# Patient Record
Sex: FEMALE | Race: WHITE | NOT HISPANIC OR LATINO | Employment: FULL TIME | URBAN - METROPOLITAN AREA
[De-identification: names, ages, dates, MRNs, and addresses within clinical notes are randomized per-mention and may not be internally consistent; named-entity substitution may affect disease eponyms.]

---

## 2017-04-13 ENCOUNTER — ALLSCRIPTS OFFICE VISIT (OUTPATIENT)
Dept: OTHER | Facility: OTHER | Age: 42
End: 2017-04-13

## 2017-06-21 ENCOUNTER — ALLSCRIPTS OFFICE VISIT (OUTPATIENT)
Dept: OTHER | Facility: OTHER | Age: 42
End: 2017-06-21

## 2017-07-27 ENCOUNTER — ALLSCRIPTS OFFICE VISIT (OUTPATIENT)
Dept: OTHER | Facility: OTHER | Age: 42
End: 2017-07-27

## 2017-07-27 PROCEDURE — 88305 TISSUE EXAM BY PATHOLOGIST: CPT | Performed by: SURGERY

## 2017-07-30 ENCOUNTER — LAB REQUISITION (OUTPATIENT)
Dept: LAB | Facility: HOSPITAL | Age: 42
End: 2017-07-30
Payer: COMMERCIAL

## 2017-07-30 DIAGNOSIS — D49.2 NEOPLASM OF UNSPECIFIED BEHAVIOR OF BONE, SOFT TISSUE, AND SKIN: ICD-10-CM

## 2017-08-16 ENCOUNTER — ALLSCRIPTS OFFICE VISIT (OUTPATIENT)
Dept: OTHER | Facility: OTHER | Age: 42
End: 2017-08-16

## 2017-08-16 ENCOUNTER — GENERIC CONVERSION - ENCOUNTER (OUTPATIENT)
Dept: OTHER | Facility: OTHER | Age: 42
End: 2017-08-16

## 2017-10-05 ENCOUNTER — GENERIC CONVERSION - ENCOUNTER (OUTPATIENT)
Dept: OTHER | Facility: OTHER | Age: 42
End: 2017-10-05

## 2017-10-23 ENCOUNTER — GENERIC CONVERSION - ENCOUNTER (OUTPATIENT)
Dept: OTHER | Facility: OTHER | Age: 42
End: 2017-10-23

## 2017-12-07 ENCOUNTER — ALLSCRIPTS OFFICE VISIT (OUTPATIENT)
Dept: OTHER | Facility: OTHER | Age: 42
End: 2017-12-07

## 2017-12-07 ENCOUNTER — GENERIC CONVERSION - ENCOUNTER (OUTPATIENT)
Dept: OTHER | Facility: OTHER | Age: 42
End: 2017-12-07

## 2017-12-08 LAB
A/G RATIO (HISTORICAL): 1.8 (ref 1.2–2.2)
ALBUMIN SERPL BCP-MCNC: 4.3 G/DL (ref 3.5–5.5)
ALP SERPL-CCNC: 47 IU/L (ref 39–117)
ALT SERPL W P-5'-P-CCNC: 27 IU/L (ref 0–32)
ANTINUCLEAR ANTIBODIES, IFA (HISTORICAL): NEGATIVE
AST SERPL W P-5'-P-CCNC: 26 IU/L (ref 0–40)
BASOPHILS # BLD AUTO: 0 %
BASOPHILS # BLD AUTO: 0 X10E3/UL (ref 0–0.2)
BILIRUB SERPL-MCNC: 0.9 MG/DL (ref 0–1.2)
BUN SERPL-MCNC: 8 MG/DL (ref 6–24)
BUN/CREA RATIO (HISTORICAL): 10 (ref 9–23)
CALCIUM SERPL-MCNC: 9.2 MG/DL (ref 8.7–10.2)
CHLORIDE SERPL-SCNC: 99 MMOL/L (ref 96–106)
CO2 SERPL-SCNC: 28 MMOL/L (ref 18–29)
CREAT SERPL-MCNC: 0.79 MG/DL (ref 0.57–1)
DEPRECATED RDW RBC AUTO: 14.3 % (ref 12.3–15.4)
EGFR AFRICAN AMERICAN (HISTORICAL): 108 ML/MIN/1.73
EGFR-AMERICAN CALC (HISTORICAL): 93 ML/MIN/1.73
EOSINOPHIL # BLD AUTO: 0.2 X10E3/UL (ref 0–0.4)
EOSINOPHIL # BLD AUTO: 2 %
ERYTHROCYTE SEDIMENTATION RATE (HISTORICAL): 3 MM/HR (ref 0–32)
GLUCOSE SERPL-MCNC: 93 MG/DL (ref 65–99)
HCT VFR BLD AUTO: 36.3 % (ref 34–46.6)
HGB BLD-MCNC: 12 G/DL (ref 11.1–15.9)
IMM.GRANULOCYTES (CD4/8) (HISTORICAL): 0 X10E3/UL (ref 0–0.1)
IMM.GRANULOCYTES (CD4/8) (HISTORICAL): 1 %
LDH (HISTORICAL): 139 IU/L (ref 119–226)
LYME IGG/IGM AB (HISTORICAL): <0.91 ISR (ref 0–0.9)
LYMPHOCYTES # BLD AUTO: 1.9 X10E3/UL (ref 0.7–3.1)
LYMPHOCYTES # BLD AUTO: 21 %
MCH RBC QN AUTO: 28.4 PG (ref 26.6–33)
MCHC RBC AUTO-ENTMCNC: 33.1 G/DL (ref 31.5–35.7)
MCV RBC AUTO: 86 FL (ref 79–97)
MONOCYTES # BLD AUTO: 1 X10E3/UL (ref 0.1–0.9)
MONOCYTES (HISTORICAL): 11 %
NEUTROPHILS # BLD AUTO: 5.8 X10E3/UL (ref 1.4–7)
NEUTROPHILS # BLD AUTO: 65 %
PLATELET # BLD AUTO: 243 X10E3/UL (ref 150–379)
POTASSIUM SERPL-SCNC: 3.6 MMOL/L (ref 3.5–5.2)
RA LATEX TURBID (HISTORICAL): <10 IU/ML (ref 0–13.9)
RBC (HISTORICAL): 4.23 X10E6/UL (ref 3.77–5.28)
SODIUM SERPL-SCNC: 142 MMOL/L (ref 134–144)
TOT. GLOBULIN, SERUM (HISTORICAL): 2.4 G/DL (ref 1.5–4.5)
TOTAL PROTEIN (HISTORICAL): 6.7 G/DL (ref 6–8.5)
URIC ACID (HISTORICAL): 3.1 MG/DL (ref 2.5–7.1)
WBC # BLD AUTO: 8.9 X10E3/UL (ref 3.4–10.8)

## 2017-12-08 NOTE — PROGRESS NOTES
Assessment    1  Abnormal MRI of head (793 0) (R93 0)   2  Chronic headaches (784 0) (R51)   3  Dizziness (780 4) (R42)    Plan  Abnormal MRI of head, Chronic headaches    · (1) CBC/PLT/DIFF; Status:Active; Requested for:29Hhe7616;    · *1 - [facility]  *  Status: Active  Requested for: 82OQG0750  Care Summary provided  : Yes  Chronic headaches    · Propranolol HCl - 10 MG Oral Tablet; Take 1 tablet twice daily   · (1) TAIWO SCREEN W/REFLEX TO TITER/PATTERN; Status:Active; Requestedfor:07Dec2017;    · (1) COMPREHENSIVE METABOLIC PANEL; Status:Active; Requested for:07Dec2017;    · (1) LD (LDH); Status:Active; Requested for:07Dec2017;    · (1) LYME ANTIBODY PROFILE W/REFLEX TO WESTERN BLOT; Status:Active; Requested for:07Dec2017;    · (1) RHEUMATOID FACTOR SCREEN; Status:Active; Requested for:07Dec2017;    · (1) SED RATE; Status:Active; Requested for:07Dec2017;    · (1) URIC ACID; Status:Active; Requested for:07Dec2017;     Discussion/Summary    Etiology is unclearat labs and will reorder someMRI report and is nonspecific but showing white matter changesrefer to Neurology for evaluationlabs delay come backWill start propranolol 10 mg 1 twice a day for migraine prophylaxis and see if this helps her headaches  Chief Complaint    1  Dizziness   2  Headache  Dizziness, headaches x 1 month      History of Present Illness  HPI: here for headaches for 3 weekschronic and never relentinggetting attacks of significant dizziness to the point of vomitingseen urgent care multiple times and had MRI as well as lab work orderedwas abnormal showing white matter changes but very nonspecificdisease test was negative      Review of Systems   Constitutional: No fever, no chills, feels well, no tiredness, no recent weight gain or loss  ENT: no ear ache, no loss of hearing, no nosebleeds or nasal discharge, no sore throat or hoarseness    Cardiovascular: no complaints of slow or fast heart rate, no chest pain, no palpitations, no leg claudication or lower extremity edema  Respiratory: no complaints of shortness of breath, no wheezing, no dyspnea on exertion, no orthopnea or PND  Breasts: no complaints of breast pain, breast lump or nipple discharge  Gastrointestinal: no complaints of abdominal pain, no constipation, no nausea or diarrhea, no vomiting, no bloody stools  Genitourinary: no complaints of dysuria, no incontinence, no pelvic pain, no dysmenorrhea, no vaginal discharge or abnormal vaginal bleeding  Musculoskeletal: no complaints of arthralgia, no myalgia, no joint swelling or stiffness, no limb pain or swelling  Integumentary: no complaints of skin rash or lesion, no itching or dry skin, no skin wounds  Neurological: as noted in HPI  Active Problems  1  Acute bronchitis due to Haemophilus influenzae (466 0,041 5) (J20 1)   2  Acute pansinusitis, unspecified (461 8) (J01 40)   3  Acute upper respiratory infection (465 9) (J06 9)   4  Amenorrhea (626 0) (N91 2)   5  Asthma (493 90) (J45 909)   6  Birth control (V25 9) (Z30 9)   7  Contact dermatitis (692 9) (L25 9)   8  Depression (311) (F32 9)   9  Dyspnea (786 09) (R06 00)   10  Facial swelling (784 2) (R22 0)   11  Fatigue (780 79) (R53 83)   12  GERD (gastroesophageal reflux disease) (530 81) (K21 9)   13  Lateral epicondylitis, unspecified laterality (726 32) (M77 10)   14  Muscle spasm (728 85) (M62 838)   15  Neck pain (723 1) (M54 2)   16  Never A Smoker   17  Panic attack (300 01) (F41 0)   18  Pap smear for cervical cancer screening (V76 2) (Z12 4)   19  Rash (782 1) (R21)   20  Sinusitis (473 9) (J32 9)   21  Skin neoplasm (239 2) (D49 2)   22  Tendonitis of knee, left (727 09) (P79 307)    Past Medical History  1  History of Difficulty breathing (786 09) (R06 89)   2  History of tension headache (V13 89) (J88 568)  Active Problems And Past Medical History Reviewed: The active problems and past medical history were reviewed and updated today  Family History  Mother    1  No pertinent family history  Family History Reviewed: The family history was reviewed and updated today  Social History     · Never A Smoker  The social history was reviewed and updated today  The social history was reviewed and is unchanged  Surgical History  Surgical History Reviewed: The surgical history was reviewed and updated today  Current Meds   1  ALPRAZolam 0 25 MG Oral Tablet; Take 1 tablet twice daily; Therapy: 35EVQ2127 to (Evaluate:04Nov2017); Last Rx:05Oct2017 Ordered   2  Hydrocortisone 2 5 % External Ointment; apply twice daily to affected area; Therapy: 88RZH4643 to (Evaluate:33Vdf3674)  Requested for: 53CJH9351; Last Rx:45Jrj8985 Ordered   3  ProAir  (90 Base) MCG/ACT Inhalation Aerosol Solution; INHALE 1 TO 2 PUFFS EVERY 4 TO 6 HOURS AS NEEDED; Therapy: 71Efs8352 to (Evaluate:11Nov2016)  Requested for: 07Hdo4782; Last Rx:85Zmg0398 Ordered   4  Venlafaxine HCl ER 75 MG Oral Capsule Extended Release 24 Hour; TAKE 1 CAPSULE ONCE DAILY; Therapy: (Recorded:23Oct2017) to Recorded    The medication list was reviewed and updated today  Allergies  1  No Known Drug Allergies    Vitals   Recorded: 78SFE3767 01:27PM   Heart Rate 90, R Radial   Pulse Quality Regular, R Radial   Systolic 369, RUE, Sitting   Diastolic 76, RUE, Sitting   Height 5 ft    Weight 156 lb    BMI Calculated 30 47   BSA Calculated 1 68       Physical Exam   Constitutional  General appearance: No acute distress, well appearing and well nourished  Ears, Nose, Mouth, and Throat  Otoscopic examination: Tympanic membranes translucent with normal light reflex  Canals patent without erythema  Oropharynx: Normal with no erythema, edema, exudate or lesions  Pulmonary  Respiratory effort: No increased work of breathing or signs of respiratory distress  Auscultation of lungs: Clear to auscultation     Cardiovascular  Auscultation of heart: Normal rate and rhythm, normal S1 and S2, without murmurs  Carotid pulses: Normal    Abdomen  Abdomen: Non-tender, no masses  Liver and spleen: No hepatomegaly or splenomegaly  Lymphatic  Palpation of lymph nodes in neck: No lymphadenopathy  Musculoskeletal  Gait and station: Normal    Digits and nails: Normal without clubbing or cyanosis  Inspection/palpation of joints, bones, and muscles: Normal    Skin  Skin and subcutaneous tissue: Normal without rashes or lesions  Neurologic  Cranial nerves: Cranial nerves 2-12 intact  Reflexes: 2+ and symmetric  Sensation: No sensory loss  Psychiatric  Orientation to person, place, and time: Normal    Mood and affect: Normal          Future Appointments    Date/Time Provider Specialty Site   01/05/2018 10:00 AM DANNY Wells   Neurology  [facility]       Signatures   Electronically signed by : Sammy Dunbar DO; Dec  7 2017  5:38PM EST                       (Author)

## 2017-12-15 ENCOUNTER — GENERIC CONVERSION - ENCOUNTER (OUTPATIENT)
Dept: OTHER | Facility: OTHER | Age: 42
End: 2017-12-15

## 2018-01-05 ENCOUNTER — ALLSCRIPTS OFFICE VISIT (OUTPATIENT)
Dept: OTHER | Facility: OTHER | Age: 43
End: 2018-01-05

## 2018-01-05 DIAGNOSIS — R06.00 DYSPNEA: ICD-10-CM

## 2018-01-05 DIAGNOSIS — R42 DIZZINESS AND GIDDINESS: ICD-10-CM

## 2018-01-05 DIAGNOSIS — G43.109 MIGRAINE WITH AURA AND WITHOUT STATUS MIGRAINOSUS, NOT INTRACTABLE: ICD-10-CM

## 2018-01-07 NOTE — CONSULTS
Assessment   1  Dizziness (780 4) (R42)   2  Classical migraine without intractable migraine (346 00) (G43 109)   3  Vertigo (780 4) (R42)   4  Postural dizziness with presyncope (780 4,780 2) (U79,Y00)    Plan   Classical migraine without intractable migraine    · Butalbital-APAP-Caffeine -40 MG Oral Capsule; TAKE 1 CAPSULE EVERY 4    HOURS AS NEEDED   Rx By: Myesha Pickard; Dispense: 30 Days ; #:12 Capsule; Refill: 1;For: Classical migraine without intractable migraine; ADRIANNE = N; Faxed To: [facility]   · Meclizine HCl - 12 5 MG Oral Tablet; TAKE 1 TABLET 3 TIMES DAILY AS NEEDED   Rx By: Myesha Pickard; Dispense: 30 Days ; #:90 Tablet; Refill: 2;For: Classical migraine without intractable migraine; ADRIANNE = N; Faxed To: [facility]   · Prochlorperazine Maleate 5 MG Oral Tablet; TAKE 1 TABLET EVERY 4 HOURS AS    NEEDED FOR NAUSEA   Rx By: Myesha Pickard; Dispense: 30 Days ; #:30 Tablet; Refill: 0;For: Classical migraine without intractable migraine; ADRIANNE = N; Faxed To: [facility]   · Venlafaxine HCl ER 37 5 MG Oral Capsule Extended Release 24 Hour; TAKE 1    CAPSULE ONCE DAILY WITH FOOD   Rx By: Myesha Pickard; Dispense: 30 Days ; #:30 Capsule Extended Release 24 Hour; Refill: 2;For: Classical migraine without intractable migraine; ADRIANNE = N; Faxed To: [address] [place] [address]   · [address] [place] [address] Physician Assistant Co-Management  6 WEEKS FOLLOW UP     Status: Active  Requested for: 57RWK3717   Ordered; For: Classical migraine without intractable migraine; Ordered By: Myesha Pickard Performed:  Due: 87TIU1545  Care Summary provided  : Yes   · Follow-up visit in 6 weeks Evaluation and Treatment  Follow-up  Status: Complete  Done:    10QSC6736   Ordered; For: Classical migraine without intractable migraine; Ordered By: Myesha Pickard Performed:  Due: 57JBI7559;  Last Updated By: Junior King; 1/5/2018 11:20:38 AM  Classical migraine without intractable migraine, Vertigo    · VAS CAROTID COMPLETE STUDY; SIDE:Bilateral; Status:Active; Requested    [facility]69UMD2493; Perform:[facility]; BRIAN:70JFQ0458; Last Updated By:Chavez Hurst; 2018 11:20:38 AM;Ordered; For:Classical migraine without intractable migraine, Vertigo; Ordered By:Kamryn Tierney; Discussion/Summary   Mrs Viky Arriaga has presented for evaluation of worsening headaches and anxiety in the setting of increased stress level in her family  Patient described chronic daily headaches and break through migraine with retro-orbital pain  MRI brain was completed and no CD was available for review  Report suggest patient has mild white matter changes likely due to [facility] matter disease, no radiographic signs of MS or other demyelination  We agreed to consider starting preventive therapy with magnesium 400 mg, riboflavin 200 mg bid,and Vitamin B12 1000 mcg daily  We will increase her venlafaxine by adding additional 37 5 mg to her current regimen of Venlafaxine 75 mg daily, as she has more dizziness while taking 150 mg HS  Patient discontinued propranolol previously  For abortive therapy - she will start Compazine 5 mg and fioricet in addition to Ibuprofen 400 mg  Patient has transient bouts of vertigo - clinically resembling BPPV - she was asked to follow with primary team to be further evaluated by ENT and consider vestibular therapy once needed  Meclizine 12 5 mg provided- side effects such as sedation was discussed  No signs of nystagmus of abnormal OKN noted on today's evaluation  Vestibular migraine is on differential, but her presentation is atypical       Patient's mother carries diagnosis of Menieres disease - formal evaluation by ENT team was recommended as well        Patient described the event of near syncope during the vertigo and events of panic attack- she will have Carotid Doppler US to rule out carotid sinus pathology, but she would also benefit from the stress test due to several family members had cardiac death  Patient was asked to follow with primary team as well  No focal neurologic deficit noted on exam          Counseling Documentation With Imm: Greater than 50% of the 60 minutes evaluation was a face-to-face discussion regarding  the pathophysiology of her current symptoms and further plan, as well as counseling, educating, and coordinating the patientâs care  Chief Complaint   Chief Complaint Free Text Note Form: Patient is here today as a new patient for a consult for an abnormal MRI of the head  History of Present Illness   Mrs Lucila Erazo is a 77-year-old female with a history of depression, anxiety, panic attack, bronchitis/ pansinusitis, chronic headaches, dizziness, who presented to [facility] multiple [address] for evaluation of her abnormal brain MRI  Patient had presented with her   No CD with MRI available during her encounter  Patient describing having progressive worsening of her headache since 12 months ago  Patient described previously she had headaches intensity of 4-5/10, experiencing them once a week, but now frequency and intensity of her headache has been getting worse  She described her headache severity is 9/10, predominantly in frontal and retro-orbital area bilaterally  Patient was prescribed propranolol and venlafaxine, she discontinued propranolol and she was not able to tolerate increased dose of venlafaxine  Patient also stated that her side is getting worse and evaluation by Ophthalmology suggested patient requires glasses  Brain MRI was completed patient has mild white matter changes likely due to migraines with no ischemic changes or hemorrhages appreciated, as per the report  Under issue patient described having bouts of vertigo, they may or may not be triggered by movement of her head at come at any time during the day    Patient had single event of vertigo were she felt like passing out  Vertigo comes and goes, not every day but up to 10 times per week  Patient had not seen ENT specialist, her mother was diagnosed with Meniere's disease  Review of Systems   Neurological ROS:      Constitutional: no fever, no chills, no recent weight gain, no recent weight loss, no complaints of feeling tired, no changes in appetite  HEENT:  no sinus problems, not feeling congested, no blurred vision, no dryness of the eyes, no eye pain, no hearing loss, no tinnitus, no mouth sores, no sore throat, no hoarseness, no dysphagia, no masses, no bleeding  Cardiovascular:  no chest pain or pressure, no palpitations present, the heart rate was not rapid or irregular, no swelling in the arms or legs, no poor circulation  Respiratory:  no unusual or persistant cough, no shortness of breath with or without exertion  Gastrointestinal:  no nausea, no vomiting, no diarrhea, no abdominal pain, no changes in bowel habits, no melena, no loss of bowel control  Genitourinary:  no incontinence, no feelings of urinary urgency, no increase in frequency, no urinary hesitancy, no dysuria, no hematuria  Musculoskeletal:  no arthralgias, no myalgias, no immobility or loss of function, no head/neck/back pain, no pain while walking  Integumentary  no masses, no rash, no skin lesions, no livedo reticularis  Psychiatric: anxiety-- and-- depression  Endocrine  no unusual weight loss or gain, no excessive urination, no excessive thirst, no hair loss or gain, no hot or cold intolerance, no menstrual period change or irregularity, no loss of sexual ability or drive, no erection difficulty, no nipple discharge  Hematologic/Lymphatic:  no unusual bleeding, no tendency for easy bruising, no clotting skin or lumps  Neurological General: headache        Neurological Mental Status:  no confusion, no mood swings, no alteration or loss of consciousness, no difficulty expressing/understanding speech, no memory problems  Neurological Cranial Nerves: vertigo or dizziness  Neurological Motor findings include:  no tremor, no twitching, no cramping(pre/post exercise), no atrophy  Neurological Coordination:  no unsteadiness, no vertigo or dizziness, no clumsiness, no problems reaching for objects  Neurological Sensory:  no numbness, no pain, no tingling, does not fall when eyes closed or taking a shower  Neurological Gait:  no difficulty walking, not falling to one side, no sensation of being pushed, has not had falls  ROS Reviewed:    ROS reviewed  Active Problems   1  Abnormal MRI of head (793 0) (R93 0)   2  Acute bronchitis due to Haemophilus influenzae (466 0,041 5) (J20 1)   3  Acute pansinusitis, unspecified (461 8) (J01 40)   4  Acute upper respiratory infection (465 9) (J06 9)   5  Amenorrhea (626 0) (N91 2)   6  Asthma (493 90) (J45 909)   7  Birth control (V25 9) (Z30 9)   8  Chronic headaches (784 0) (R51)   9  Contact dermatitis (692 9) (L25 9)   10  Depression (311) (F32 9)   11  Dizziness (780 4) (R42)   12  Dyspnea (786 09) (R06 00)   13  Facial swelling (784 2) (R22 0)   14  Fatigue (780 79) (R53 83)   15  GERD (gastroesophageal reflux disease) (530 81) (K21 9)   16  Lateral epicondylitis, unspecified laterality (726 32) (M77 10)   17  Muscle spasm (728 85) (M62 838)   18  Neck pain (723 1) (M54 2)   19  Never A Smoker   20  Panic attack (300 01) (F41 0)   21  Pap smear for cervical cancer screening (V76 2) (Z12 4)   22  Rash (782 1) (R21)   23  Sinusitis (473 9) (J32 9)   24  Skin neoplasm (239 2) (D49 2)   25  Tendonitis of knee, left (727 09) (C55 277)    Past Medical History   1  History of Difficulty breathing (786 09) (R06 89)   2  History of tension headache (V13 89) (L90 797)  Active Problems And Past Medical History Reviewed:     The active problems and past medical history were reviewed and updated today  Surgical History   Surgical History Reviewed: The surgical history was reviewed and updated today  Family History   Mother    1  No pertinent family history  Family History Reviewed: The family history was reviewed and updated today  Social History    · Never A Smoker  Social History Reviewed: The social history was reviewed and updated today  The social history was reviewed and is unchanged  Current Meds    1  ALPRAZolam 0 25 MG Oral Tablet; Take 1 tablet twice daily; Therapy: 71IDG2652 to (Evaluate:04Nov2017); Last Rx:05Oct2017 Ordered   2  Hydrocortisone 2 5 % External Ointment; apply twice daily to affected area; Therapy: 05FHO2811 to (Evaluate:49Tkw1747)  Requested for: 19PVS2521; Last     Rx:95Pxh0772 Ordered   3  ProAir  (90 Base) MCG/ACT Inhalation Aerosol Solution; INHALE 1 TO 2 PUFFS     EVERY 4 TO 6 HOURS AS NEEDED; Therapy: 61Vot7632 to (Evaluate:11Nov2016)  Requested for: 96Woo6296; Last     Rx:45Iii3151 Ordered   4  Venlafaxine HCl ER 75 MG Oral Capsule Extended Release 24 Hour; TAKE 1 CAPSULE     ONCE DAILY; Therapy: (Recorded:23Oct2017) to Recorded    Allergies   1  No Known Drug Allergies    Vitals   Signs   Recorded: 64NCG3563 10:04AM   Heart Rate: 100  Respiration: 18  Systolic: 281  Diastolic: 85  Height: 5 ft   Weight: 152 lb 6 oz  BMI Calculated: 29 76  BSA Calculated: 1 66  O2 Saturation: 99    Results/Data   CONSTITUTIONAL: NAD, pleasant  NECK: supple, no lymphadenopathy, no thyromegaly, no JVD  CARDIOVASCULAR: RRR, normal S1S2, no murmurs, no rubs  RESP: clear to auscultation bilaterally, no wheezes/rhonchi/rales  ABDOMEN: soft, non tender, non distended  SKIN: no rash or skin lesions  EXTREMITIES: no edema, pulses 2+bilaterally  PSYCH: appropriate mood and affect     NEUROLOGIC COMPREHENSIVE EXAM: Patient is oriented to person, place and time, NAD; appropriate affect   CN II, III, IV, V, VI, VII,VIII,IX,X,XI-XII intact with EOMI, PERRLA, OKN intact, VF grossly intact, fundi poorly visualized secondary to pupillary constriction; symmetric face noted  Motor: 5/5 UE/LE bilateral symmetric; Sensory: intact to light touch and pinprick bilaterally; normal vibration sensation feet bilaterally; Coordination within normal limits on FTN and LUKE testing; DTR: 2/4 through, no Babinski, no clonus  Tandem gait is intact  Romberg: negative               Future Appointments      Date/Time Provider Specialty Site   02/16/2018 12:00 PM Nadya Marion MD Neurology [address]     Signatures    Electronically signed by : DANNY Rebollar ; Jan 6 2018  5:57PM EST                       (Author)

## 2018-01-09 ENCOUNTER — GENERIC CONVERSION - ENCOUNTER (OUTPATIENT)
Dept: OTHER | Facility: OTHER | Age: 43
End: 2018-01-09

## 2018-01-12 VITALS
HEART RATE: 88 BPM | BODY MASS INDEX: 28.86 KG/M2 | DIASTOLIC BLOOD PRESSURE: 70 MMHG | WEIGHT: 147 LBS | HEIGHT: 60 IN | RESPIRATION RATE: 16 BRPM | SYSTOLIC BLOOD PRESSURE: 124 MMHG

## 2018-01-13 VITALS — WEIGHT: 152 LBS | BODY MASS INDEX: 29.84 KG/M2 | HEIGHT: 60 IN

## 2018-01-13 NOTE — MISCELLANEOUS
Message   Recorded as Task   Date: 10/17/2016 03:52 PM, Created By: Mateusz Neville   Task Name: Follow Up   Assigned To: Maria Luisa Luevano   Regarding Patient: GISELA ZARATE, Status: Active   CommentMaud Nannette - 17 Oct 2016 3:52 PM     TASK CREATED  Caller: Self; General Medical Question; [phone] (Home); [phone] (Work)  MEDICATION IS HELPING WITH CHEST DISCOMFORT/ACID REFLUX, HAS NO PROBLEMS  WILL NEED REFILL IF YOU WANT HER TO CONTINUE MED  SHE USES [facility] AND CAN BE REACHED -[phone]   Walker Álvarez - 18 Oct 2016 4:53 PM     TASK EDITED                 i would stop and see if her symptoms return-- if they do-- they we can restart   Maria Luisa Luevano - 18 Oct 2016 5:15 PM     TASK EDITED  Patient advised  PLM        Active Problems    1  Acute bronchitis due to Haemophilus influenzae (466 0,041 5) (J20 1)   2  Acute pansinusitis, unspecified (461 8) (J01 40)   3  Acute upper respiratory infection (465 9) (J06 9)   4  Amenorrhea (626 0) (N91 2)   5  Asthma (493 90) (J45 909)   6  Birth control (V25 9) (Z30 9)   7  Depression (311) (F32 9)   8  Dyspnea (786 09) (R06 00)   9  Facial swelling (784 2) (R22 0)   10  Fatigue (780 79) (R53 83)   11  GERD (gastroesophageal reflux disease) (530 81) (K21 9)   12  Lateral epicondylitis, unspecified laterality (726 32) (M77 10)   13  Muscle spasm (728 85) (M62 838)   14  Neck pain (723 1) (M54 2)   15  Pap smear for cervical cancer screening (V76 2) (Z12 4)   16  Rash (782 1) (R21)   17  Sinusitis (473 9) (J32 9)   18  Tendonitis of knee, left (727 09) (V46 077)    Current Meds   1  Effexor XR 75 MG Oral Capsule Extended Release 24 Hour (Venlafaxine HCl ER); TAKE   1 CAPSULE Daily Recorded   2  Pantoprazole Sodium 40 MG Oral Tablet Delayed Release (Protonix); Take 1 tablet   daily; Therapy: 57XAZ5378 to (Kaushal Vasquez)  Requested for: 19SYS3921; Last   Rx:05Oct2016 Ordered   3   ProAir  (90 Base) MCG/ACT Inhalation Aerosol Solution; INHALE 1 TO 2 PUFFS   EVERY 4 TO 6 HOURS AS NEEDED; Therapy: 90Hjx1587 to (Evaluate:11Nov2016)  Requested for: 69Ngg5823; Last   Rx:54Yig6875 Ordered    Allergies    1   No Known Drug Allergies    Signatures   Electronically signed by : Sebastián Deshpande, ; Oct 18 2016  5:16PM EST                       (Author)

## 2018-01-14 VITALS
HEART RATE: 90 BPM | BODY MASS INDEX: 27.38 KG/M2 | RESPIRATION RATE: 16 BRPM | WEIGHT: 145 LBS | TEMPERATURE: 98 F | SYSTOLIC BLOOD PRESSURE: 116 MMHG | DIASTOLIC BLOOD PRESSURE: 70 MMHG | HEIGHT: 61 IN

## 2018-01-15 ENCOUNTER — GENERIC CONVERSION - ENCOUNTER (OUTPATIENT)
Dept: OTHER | Facility: OTHER | Age: 43
End: 2018-01-15

## 2018-01-15 ENCOUNTER — HOSPITAL ENCOUNTER (OUTPATIENT)
Dept: ULTRASOUND IMAGING | Facility: CLINIC | Age: 43
Discharge: HOME | End: 2018-01-15
Payer: COMMERCIAL

## 2018-01-15 DIAGNOSIS — R42 DIZZINESS AND GIDDINESS: ICD-10-CM

## 2018-01-15 DIAGNOSIS — G43.109 MIGRAINE WITH AURA AND WITHOUT STATUS MIGRAINOSUS, NOT INTRACTABLE: ICD-10-CM

## 2018-01-15 PROCEDURE — 93880 EXTRACRANIAL BILAT STUDY: CPT

## 2018-01-15 NOTE — MISCELLANEOUS
Message   Recorded as Task   Date: 10/23/2017 04:19 PM, Created By: Roberto Carlos Garcia   Task Name: Medical Complaint Callback   Assigned To: Perla Carrizales   Regarding Patient: GISELA ZARATE, Status: Active   Comment: Roberto Carlos Garcia - 23 Oct 2017 4:19 PM     TASK CREATED  Caller: Self; Medical Complaint; [phone] (Home); [phone] (Work)  PT SAID EFFEXOR HAS BEEN GIVEN HER HA PT # [phone]   Page,Walker - 23 Oct 2017 4:59 PM     TASK EDITED  will need then to go back to 75  and see how she does   Perla Carrizales - 23 Oct 2017 5:06 PM     TASK EDITED  patient informed, med list updated        Active Problems    1  Acute bronchitis due to Haemophilus influenzae (466 0,041 5) (J20 1)   2  Acute pansinusitis, unspecified (461 8) (J01 40)   3  Acute upper respiratory infection (465 9) (J06 9)   4  Amenorrhea (626 0) (N91 2)   5  Asthma (493 90) (J45 909)   6  Birth control (V25 9) (Z30 9)   7  Contact dermatitis (692 9) (L25 9)   8  Depression (311) (F32 9)   9  Dyspnea (786 09) (R06 00)   10  Facial swelling (784 2) (R22 0)   11  Fatigue (780 79) (R53 83)   12  GERD (gastroesophageal reflux disease) (530 81) (K21 9)   13  Lateral epicondylitis, unspecified laterality (726 32) (M77 10)   14  Muscle spasm (728 85) (M62 838)   15  Neck pain (723 1) (M54 2)   16  Never A Smoker   17  Panic attack (300 01) (F41 0)   18  Pap smear for cervical cancer screening (V76 2) (Z12 4)   19  Rash (782 1) (R21)   20  Sinusitis (473 9) (J32 9)   21  Skin neoplasm (239 2) (D49 2)   22  Tendonitis of knee, left (727 09) (Y19 907)    Current Meds   1  ALPRAZolam 0 25 MG Oral Tablet; Take 1 tablet twice daily; Therapy: 85QDX4730 to (Evaluate:04Nov2017); Last Rx:05Oct2017 Ordered   2  Hydrocortisone 2 5 % External Ointment; apply twice daily to affected area; Therapy: 69INO0958 to (Evaluate:98Wrf9010)  Requested for: 60GSL2684; Last   Rx:70Okq3144 Ordered   3   ProAir  (90 Base) MCG/ACT Inhalation Aerosol Solution; INHALE 1 TO 2 PUFFS   EVERY 4 TO 6 HOURS AS NEEDED; Therapy: 87Zrs6833 to (Evaluate:11Nov2016)  Requested for: 88Hoh5698; Last   Rx:12Sep2016 Ordered   4  Venlafaxine HCl ER 75 MG Oral Capsule Extended Release 24 Hour; TAKE 1 CAPSULE   ONCE DAILY; Therapy: (Recorded:23Oct2017) to Recorded    Allergies    1   No Known Drug Allergies    Signatures   Electronically signed by : Farhat Meyers, ; Oct 23 2017  5:07PM EST                       (Author)

## 2018-01-17 NOTE — MISCELLANEOUS
Message  patient notified of normal cxr      Signatures   Electronically signed by : Doe Whitaker DO; Oct  6 2016  6:04PM EST                       (Author)

## 2018-01-19 ENCOUNTER — GENERIC CONVERSION - ENCOUNTER (OUTPATIENT)
Dept: FAMILY MEDICINE CLINIC | Facility: CLINIC | Age: 43
End: 2018-01-19

## 2018-01-19 ENCOUNTER — HOSPITAL ENCOUNTER (OUTPATIENT)
Dept: NON INVASIVE DIAGNOSTICS | Facility: CLINIC | Age: 43
Discharge: HOME | End: 2018-01-19
Payer: COMMERCIAL

## 2018-01-19 DIAGNOSIS — R42 DIZZINESS AND GIDDINESS: ICD-10-CM

## 2018-01-19 DIAGNOSIS — R06.00 DYSPNEA: ICD-10-CM

## 2018-01-19 LAB
CHEST PAIN STATEMENT: NORMAL
MAX DIASTOLIC BP: 81 MMHG
MAX HEART RATE: 184 BPM
MAX PREDICTED HEART RATE: 178 BPM
MAX. SYSTOLIC BP: 152 MMHG
PROTOCOL NAME: NORMAL
TARGET HR FORMULA: NORMAL
TEST INDICATION: NORMAL
TIME IN EXERCISE PHASE: NORMAL

## 2018-01-19 PROCEDURE — 93017 CV STRESS TEST TRACING ONLY: CPT

## 2018-01-22 VITALS
HEART RATE: 84 BPM | DIASTOLIC BLOOD PRESSURE: 82 MMHG | HEIGHT: 60 IN | BODY MASS INDEX: 29.45 KG/M2 | SYSTOLIC BLOOD PRESSURE: 120 MMHG | WEIGHT: 150 LBS

## 2018-01-22 VITALS — BODY MASS INDEX: 28.47 KG/M2 | HEIGHT: 60 IN | WEIGHT: 145 LBS

## 2018-01-22 VITALS
DIASTOLIC BLOOD PRESSURE: 76 MMHG | HEART RATE: 90 BPM | HEIGHT: 60 IN | BODY MASS INDEX: 30.63 KG/M2 | WEIGHT: 156 LBS | SYSTOLIC BLOOD PRESSURE: 130 MMHG

## 2018-01-22 VITALS — WEIGHT: 152 LBS | BODY MASS INDEX: 29.84 KG/M2 | HEIGHT: 60 IN

## 2018-01-23 VITALS
HEART RATE: 100 BPM | HEIGHT: 60 IN | BODY MASS INDEX: 29.92 KG/M2 | WEIGHT: 152.38 LBS | RESPIRATION RATE: 18 BRPM | DIASTOLIC BLOOD PRESSURE: 85 MMHG | SYSTOLIC BLOOD PRESSURE: 125 MMHG | OXYGEN SATURATION: 99 %

## 2018-01-23 NOTE — RESULT NOTES
Verified Results  VAS CAROTID COMPLETE STUDY 15Jan2018 07:56AM Barney Diehl Order Number: [id number]    - Patient Instructions: To schedule this appointment, please contact Central Scheduling at [phone]  Test Name Result Flag Reference   VAS CAROTID COMPLETE STUDY (Report)     [facility] REPORT   CLINICAL:   Indications: Patient presents with recent episodes of dizziness / near syncope   lasting at times an entire day  Patient states she has been migraines for the   past 3 months  Clinical   Right Brachial Pressure: 120/70 mmHg, Left Brachial Pressure: 120/70 mmHg  FINDINGS:      Segment   Right            Left              Impression PSV EDV (cm/s) Impression PSV EDV (cm/s)    Mid  ICA         79     32        79     30    Prox  ICA  Normal    64     21 Normal    73     26    Dist CCA         88     29       100     34    Mid CCA         105     27        98     36    Prox CCA         112     31       104     29    Ext Carotid        67      9        56      0    Prox Vert         48     19        56     22    Subclavian        91     12       119     14             CONCLUSION:   Impression   RIGHT:   There is no evidence of significant stenosis noted  The distal ICA is not   appreciated due to a high bifurcation  Vertebral artery flow is antegrade  There is no significant subclavian artery   disease  LEFT:   There is no evidence of significant stenosis noted  The distal ICA is not   appreciated due to a high bifurcation  Vertebral artery flow is antegrade  There is no significant subclavian artery   disease  Internal carotid artery stenosis determination by consensus criteria from:   Katarina Wilkes , et al  Carotid Artery Stenosis: Gray-Scale and Doppler US Diagnosis   - [employer] in [address] [place] [address] [facility], Radiology 2003;   [phone]        SIGNATURE:   Electronically Signed by: Robinson Acevedo MD on 2018-01-15 03:32:25 PM

## 2018-01-23 NOTE — MISCELLANEOUS
Message   Recorded as Task   Date: 01/05/2018 02:42 PM, Created By: Tesfaye Dasilva   Task Name: Med Renewal Request   Assigned To: Maria Luisa Luevano   Regarding Patient: GISELA ZARATE, Status: In Progress   Comment:    Christine Bustamante - 05 Jan 2018 2:42 PM     TASK CREATED  Caller: Self; Renew Medication; [phone] (Home); [phone] (Work)  PT NEEDS REFILL ON ALPRAZOLAM 0 25 MG AND USES PROFESSIONAL PHARMACY, SHE ALSO SAID HER NEUROLOGIST IS REQUESTING HER TO HAVE A STRESS TEST, CAN YOU CALL PT WHEN ORDER FOR TEST IS READY FOR PU, CONTACT FOR PT -[phone]   Walker Álvarez - 08 Jan 2018 12:45 PM     TASK EDITED  The neurologist recommended she get a stress test and To do itThrough us because of some Sort Of family history Of heart disease  Guess we Bette an exercise stress test?   Holly Haley - 08 Jan 2018 1:27 PM     TASK EDITED  Left message for pt to return call about scheduling a stress test    Holly Rios - 08 Jan 2018 1:27 PM     TASK IN PROGRESS   Holly Haley - 09 Jan 2018 11:31 AM     TASK REASSIGNED: Previously Assigned To Meg Chau - 09 Jan 2018 4:19 PM     TASK EDITED  Patient advised will order exercise stress test    requisition and schedule @ her convenience  AMP/PLM        Active Problems    1  Abnormal MRI of head (793 0) (R93 0)   2  Acute bronchitis due to Haemophilus influenzae (466 0,041 5) (J20 1)   3  Acute pansinusitis, unspecified (461 8) (J01 40)   4  Acute upper respiratory infection (465 9) (J06 9)   5  Amenorrhea (626 0) (N91 2)   6  Asthma (493 90) (J45 909)   7  Birth control (V25 9) (Z30 9)   8  Chronic headaches (784 0) (R51)   9  Classical migraine without intractable migraine (346 00) (G43 109)   10  Contact dermatitis (692 9) (L25 9)   11  Depression (311) (F32 9)   12  Dizziness (780 4) (R42)   13  Dyspnea (786 09) (R06 00)   14  Facial swelling (784 2) (R22 0)   15  Fatigue (780 79) (R5 83)   16   GERD (gastroesophageal reflux disease) (530 81) (K21 9)   17  Lateral epicondylitis, unspecified laterality (726 32) (M77 10)   18  Muscle spasm (728 85) (M62 838)   19  Neck pain (723 1) (M54 2)   20  Never A Smoker   21  Panic attack (300 01) (F41 0)   22  Pap smear for cervical cancer screening (V76 2) (Z12 4)   23  Postural dizziness with presyncope (780 4,780 2) (R42,R55)   24  Rash (782 1) (R21)   25  Sinusitis (473 9) (J32 9)   26  Skin neoplasm (239 2) (D49 2)   27  Tendonitis of knee, left (727 09) (M76 892)   28  Vertigo (780 4) (R42)    Current Meds   1  ALPRAZolam 0 25 MG Oral Tablet; Take 1 tablet twice daily; Therapy: 73TLA9690 to (Evaluate:64Ste7962); Last Rx:05Jan2018 Ordered   2  Butalbital-APAP-Caffeine -40 MG Oral Capsule; TAKE 1 CAPSULE EVERY 4   HOURS AS NEEDED; Therapy: 11GYJ7182 to (Evaluate:06Mar2018); Last Rx:05Jan2018 Ordered   3  Hydrocortisone 2 5 % External Ointment; apply twice daily to affected area; Therapy: 04XGO4925 to (Evaluate:08Gwh1246)  Requested for: 81RAC4416; Last   Rx:31Wum8812 Ordered   4  Meclizine HCl - 12 5 MG Oral Tablet; TAKE 1 TABLET 3 TIMES DAILY AS NEEDED; Therapy: 79JOF0742 to (Evaluate:05Apr2018); Last Rx:05Jan2018 Ordered   5  ProAir  (90 Base) MCG/ACT Inhalation Aerosol Solution; INHALE 1 TO 2 PUFFS   EVERY 4 TO 6 HOURS AS NEEDED; Therapy: 11Nzk7164 to (Evaluate:60Vpv8221)  Requested for: 41Jhm9656; Last   Rx:12Sep2016 Ordered   6  Prochlorperazine Maleate 5 MG Oral Tablet; TAKE 1 TABLET EVERY 4 HOURS AS   NEEDED FOR NAUSEA; Therapy: 99DTV2397 to (Evaluate:08Bvi1158); Last Rx:05Jan2018 Ordered   7  Venlafaxine HCl ER 37 5 MG Oral Capsule Extended Release 24 Hour; TAKE 1   CAPSULE ONCE DAILY WITH FOOD; Therapy: 18TZZ8807 to (Evaluate:05Apr2018); Last Rx:05Jan2018 Ordered   8  Venlafaxine HCl ER 75 MG Oral Capsule Extended Release 24 Hour; TAKE 1 CAPSULE   ONCE DAILY; Therapy: (Recorded:23Oct2017) to Recorded    Allergies    1   No Known Drug Allergies    Signatures   Electronically signed by : Qian Baez, ; Jan 9 2018  4:20PM EST                       (Author)

## 2018-01-23 NOTE — MISCELLANEOUS
Message  patient notified and feeling much better on meds        Signatures   Electronically signed by : Yamila Page DO; Dec 15 2017 10:24AM EST                       (Author)

## 2018-02-15 RX ORDER — VENLAFAXINE HYDROCHLORIDE 75 MG/1
1 CAPSULE, EXTENDED RELEASE ORAL DAILY
COMMUNITY
End: 2018-05-04 | Stop reason: ALTCHOICE

## 2018-02-15 RX ORDER — VENLAFAXINE HYDROCHLORIDE 37.5 MG/1
1 CAPSULE, EXTENDED RELEASE ORAL DAILY
COMMUNITY
Start: 2018-01-05 | End: 2018-02-16 | Stop reason: ALTCHOICE

## 2018-02-15 RX ORDER — SUMATRIPTAN 25 MG/1
TABLET, FILM COATED ORAL
Refills: 0 | COMMUNITY
Start: 2017-12-01 | End: 2018-02-16 | Stop reason: ALTCHOICE

## 2018-02-15 RX ORDER — MECLIZINE HCL 12.5 MG/1
1 TABLET ORAL 3 TIMES DAILY PRN
COMMUNITY
Start: 2018-01-05 | End: 2018-02-16 | Stop reason: ALTCHOICE

## 2018-02-15 RX ORDER — ALBUTEROL SULFATE 90 UG/1
2 AEROSOL, METERED RESPIRATORY (INHALATION)
COMMUNITY
Start: 2016-09-12 | End: 2019-01-08 | Stop reason: ALTCHOICE

## 2018-02-15 RX ORDER — PROPRANOLOL HYDROCHLORIDE 10 MG/1
10 TABLET ORAL 2 TIMES DAILY
Refills: 0 | COMMUNITY
Start: 2017-12-07 | End: 2018-02-16 | Stop reason: ALTCHOICE

## 2018-02-15 RX ORDER — ALPRAZOLAM 0.25 MG/1
1 TABLET ORAL 2 TIMES DAILY
COMMUNITY
Start: 2017-10-05 | End: 2018-07-26 | Stop reason: ALTCHOICE

## 2018-02-15 RX ORDER — PROCHLORPERAZINE MALEATE 5 MG/1
TABLET ORAL EVERY 4 HOURS
COMMUNITY
Start: 2018-01-05 | End: 2018-02-16 | Stop reason: ALTCHOICE

## 2018-02-15 RX ORDER — BUTALBITAL, ACETAMINOPHEN AND CAFFEINE 50; 325; 40 MG/1; MG/1; MG/1
1 CAPSULE ORAL EVERY 4 HOURS PRN
COMMUNITY
Start: 2018-01-05 | End: 2018-02-16 | Stop reason: ALTCHOICE

## 2018-02-16 ENCOUNTER — OFFICE VISIT (OUTPATIENT)
Dept: NEUROLOGY | Facility: CLINIC | Age: 43
End: 2018-02-16
Payer: COMMERCIAL

## 2018-02-16 VITALS
SYSTOLIC BLOOD PRESSURE: 110 MMHG | HEART RATE: 76 BPM | BODY MASS INDEX: 30.08 KG/M2 | WEIGHT: 154 LBS | DIASTOLIC BLOOD PRESSURE: 70 MMHG

## 2018-02-16 DIAGNOSIS — G43.009 MIGRAINE WITHOUT AURA AND WITHOUT STATUS MIGRAINOSUS, NOT INTRACTABLE: Primary | ICD-10-CM

## 2018-02-16 DIAGNOSIS — G44.209 TENSION HEADACHE: ICD-10-CM

## 2018-02-16 PROCEDURE — 99215 OFFICE O/P EST HI 40 MIN: CPT | Performed by: PSYCHIATRY & NEUROLOGY

## 2018-02-16 RX ORDER — SULFAMETHOXAZOLE AND TRIMETHOPRIM 800; 160 MG/1; MG/1
TABLET ORAL
COMMUNITY
Start: 2018-02-14 | End: 2018-02-16 | Stop reason: ALTCHOICE

## 2018-02-16 NOTE — PATIENT INSTRUCTIONS
Preventive:   - she is to take Magnesium oxide 250 mg 1-2 tabs daily in am  - Vitamin B 2 200 mg in am daily  Abortive:   - at onset she is to take aleve 200 mg 1-2 tabs  Patient education was completed today and we also discussed precautions for rebound headaches  When patient has a moderate to severe headache, they should seek rest, initiate relaxation and apply cold compresses to the head  1  Maintain regular sleep schedule  Adults need at least 7-8 hours of uninterrupted a night  2  Limit over the counter medications such as Tylenol, Ibuprofen, Aleve, Excedrin  (No more than 3 times a week)  3  Maintain headache diary  We discussed an MOISES for a smart phone is "Migraine eDiary"  4  Limit caffeine to 1-2 cups a day or less  5  Avoid dietary trigger  (list given to the patient and reviewed with them)  6  Patient is to have regular frequent meals to prevent headache onset  7   Please drink at least 64 ounces of water a day to help remain hydrated

## 2018-02-16 NOTE — PROGRESS NOTES
Patient ID: Jaylon Carroll is a 43 y o  female  Assessment/Plan:   Problem List Items Addressed This Visit        Cardiovascular and Mediastinum    Migraine without aura and without status migrainosus, not intractable - Primary    Relevant Medications    venlafaxine (EFFEXOR-XR) 75 mg 24 hr capsule    Other Relevant Orders    Vitamin B12    Vitamin D 25 hydroxy    TSH, 3rd generation       Other    Tension headache    Relevant Medications    venlafaxine (EFFEXOR-XR) 75 mg 24 hr capsule         Preventive:   - she is to take Magnesium oxide 250 mg 1-2 tabs daily in am  - Vitamin B 2 200 mg in am daily  Abortive:   - at onset she is to take aleve 200 mg 1-2 tabs  For her dizziness we can consider doing low dose of verapamil  She is not to take Fioricet and only take aleve  Patient education was completed today and we also discussed precautions for rebound headaches  When patient has a moderate to severe headache, they should seek rest, initiate relaxation and apply cold compresses to the head  1  Maintain regular sleep schedule  Adults need at least 7-8 hours of uninterrupted a night  2  Limit over the counter medications such as Tylenol, Ibuprofen, Aleve, Excedrin  (No more than 3 times a week)  3  Maintain headache diary  We discussed an MOISES for a smart phone is "Migraine eDiary"  4  Limit caffeine to 1-2 cups a day or less  5  Avoid dietary trigger  (list given to the patient and reviewed with them)  6  Patient is to have regular frequent meals to prevent headache onset  7   Please drink at least 64 ounces of water a day to help remain hydrated  Subjective:    HPI   We had the pleasure of evaluating Jaylon Carroll in neurological consultation today  As you know,  she is a 43 y o  right handed female  She is        Any family history of aneurysms - No  Family history of migraine headaches -   Yes her mother and sister            What is your current pain level - 4/10  Headaches started at what age - 39   Accident or injury prior to onset of headaches - yes she did have a head injury 3 years ago  She slipped on ice and hit her head  Does not recall having headaches after that  How often do the headaches occur - for the last three weeks she was doing well and then this past sat she has had a headaches which has persisted  What time of the day do the headaches start - anytime  How long do the headaches last - typically lasts for few days  In November she had it for 3 weeks  Are you ever headache free - yes  Where are they located - fright frontal  What is the intensity of pain - 1 to 10/10, over the last one year she had one that was severe and lasted for days  Any warning prior to headache and how long do they last -  None  Describe your usual headache -  Pressure and throbbing at time  Associated symptoms:   - Decrease of appetite, nausea  - Photophobia, phonophobia  - Problem with concentration  - light-headed or dizzy  - prefer to be alone and in a dark room, unable to work  Headache are worse if the patient: none  Headache trigger: not sure  Are you current pregnant or planning on getting pregnant? Done with family planning  What time of the year do headaches occur more frequently -  no  Have you seen someone else for headaches or pain - no  Have you had trigger point injection performed and how often -  no  Have you had Botox injection performed and how often -  no  Have you had epidural injections or transforaminal injections performed - no  What medications do you take or have you taken for your headaches? PREVENTIVE: venlafaxine, propranolol, mag, b2  ABORTIVE: Fioricet, aleve, tylenol, excidrin  Non-Medical/Alternative Treatments used in the past for headaches: none    Objective:    Blood pressure 110/70, pulse 76, weight 69 9 kg (154 lb)  Physical Exam   Constitutional: She appears well-developed     Eyes: EOM are normal  Pupils are equal, round, and reactive to light  Neck: Normal range of motion  Neck supple  Cardiovascular: Normal rate  Pulmonary/Chest: Effort normal    Abdominal: Soft  Musculoskeletal: Normal range of motion  Neurological: She has normal strength and normal reflexes  Gait and coordination normal    Skin: Skin is warm  Psychiatric: She has a normal mood and affect  Her speech is normal        Neurological Exam    Mental Status  The patient is alert and disoriented to person, place and time  Her speech is normal  Her language is fluent with no aphasia  She has normal attention span and concentration  Cranial Nerves    CN II: The patient's visual acuity and visual fields are normal   CN III, IV, VI: The patient's pupils are equally round and reactive to light and ocular movements are normal   CN V: The patient has normal facial sensation  CN VII:  The patient has symmetric facial movement  CN VIII:  The patient's hearing is normal   CN IX, X: The patient has symmetric palate movement and normal gag reflex  CN XI: The patient's shoulder shrug strength is normal   CN XII: The patient's tongue is midline without atrophy or fasciculations  Motor  The patient has normal muscle bulk throughout  Her overall muscle tone is normal throughout  Her strength is 5/5 throughout all four extremities  Sensory  The patient's sensation is normal in all four extremities  Reflexes  Deep tendon reflexes are 2+ and symmetric in all four extremities with downgoing toes bilaterally  Gait and Coordination  The patient has normal gait and station and normal casual, toe, heel, and tandem gait  She has normal coordination bilaterally  ROS:    Review of Systems   Constitutional: Negative  Negative for appetite change and fever  HENT: Negative  Negative for hearing loss, tinnitus, trouble swallowing and voice change  Eyes: Negative  Negative for photophobia and pain  Respiratory: Negative  Negative for shortness of breath  Cardiovascular: Negative  Negative for palpitations  Gastrointestinal: Negative  Negative for nausea and vomiting  Endocrine: Negative  Negative for cold intolerance and heat intolerance  Genitourinary: Negative  Negative for dysuria, frequency and urgency  Musculoskeletal: Negative  Negative for myalgias and neck pain  Skin: Negative  Negative for rash  Allergic/Immunologic: Negative  Neurological: Positive for light-headedness and headaches  Negative for tremors, seizures, syncope, facial asymmetry, speech difficulty, weakness and numbness  Waking at up at night   Hematological: Negative  Does not bruise/bleed easily  Psychiatric/Behavioral: Negative for confusion, hallucinations and sleep disturbance  The patient is nervous/anxious

## 2018-05-04 ENCOUNTER — OFFICE VISIT (OUTPATIENT)
Dept: NEUROLOGY | Facility: CLINIC | Age: 43
End: 2018-05-04
Payer: COMMERCIAL

## 2018-05-04 VITALS
HEART RATE: 85 BPM | SYSTOLIC BLOOD PRESSURE: 124 MMHG | DIASTOLIC BLOOD PRESSURE: 65 MMHG | HEIGHT: 60 IN | BODY MASS INDEX: 31.41 KG/M2 | WEIGHT: 160 LBS

## 2018-05-04 DIAGNOSIS — G43.009 MIGRAINE WITHOUT AURA AND WITHOUT STATUS MIGRAINOSUS, NOT INTRACTABLE: Primary | ICD-10-CM

## 2018-05-04 DIAGNOSIS — G43.109 VERTIGINOUS MIGRAINE: ICD-10-CM

## 2018-05-04 DIAGNOSIS — G44.209 TENSION HEADACHE: ICD-10-CM

## 2018-05-04 PROCEDURE — 99214 OFFICE O/P EST MOD 30 MIN: CPT | Performed by: PSYCHIATRY & NEUROLOGY

## 2018-05-04 RX ORDER — VENLAFAXINE HYDROCHLORIDE 75 MG/1
75 CAPSULE, EXTENDED RELEASE ORAL DAILY
Refills: 3 | COMMUNITY
Start: 2018-04-26 | End: 2018-06-20 | Stop reason: SDUPTHER

## 2018-05-04 RX ORDER — VERAPAMIL HYDROCHLORIDE 40 MG/1
TABLET ORAL
Qty: 30 TABLET | Refills: 1 | Status: SHIPPED | OUTPATIENT
Start: 2018-05-04 | End: 2018-06-20

## 2018-05-04 RX ORDER — CYANOCOBALAMIN (VITAMIN B-12) 2000 MCG
500 TABLET ORAL DAILY
COMMUNITY
End: 2018-06-20

## 2018-05-04 RX ORDER — CYPROHEPTADINE HYDROCHLORIDE 4 MG/1
TABLET ORAL
Qty: 30 TABLET | Refills: 0 | Status: SHIPPED | OUTPATIENT
Start: 2018-05-04 | End: 2018-06-20

## 2018-05-04 NOTE — PROGRESS NOTES
Patient ID: Asim Carrillo is a 43 y o  female  Assessment/Plan:   Problem List Items Addressed This Visit        Cardiovascular and Mediastinum    Migraine without aura and without status migrainosus, not intractable - Primary    Relevant Medications    venlafaxine (EFFEXOR-XR) 150 mg 24 hr capsule    cyproheptadine (PERIACTIN) 4 mg tablet    verapamil (CALAN) 40 mg tablet       Other    Tension headache    Relevant Medications    venlafaxine (EFFEXOR-XR) 150 mg 24 hr capsule    verapamil (CALAN) 40 mg tablet    Vertiginous migraine    Relevant Medications    cyproheptadine (PERIACTIN) 4 mg tablet    verapamil (CALAN) 40 mg tablet          Preventive therapy for migraine headaches:  -  Will have patient take verapamil 40 mg half a tab twice a day  -   Continue venlafaxine 150 mg once a day  -   For her whether related headaches and headaches that last more than 24 hr she has to take cyproheptadine 4 mg at bedtime  Abortive therapy for migraine headaches:  -  At the onset of a migraine headache she is to take Aleve which works 90% the time for her  Found to have low vitamin-D of 20: She is to take 2000 international units of vitamin D3 on daily basis  Subjective:  HPI  We had the pleasure of evaluating Asim Carrillo in neurological Follow-up today  As you know,  she is a 43 y o  right handed female  She is   She is here today for evaluation of her headaches      Migraine headaches without aura/Tension type headaches:   What medications do you take or have you taken for your headaches? PREVENTIVE: venlafaxine, propranolol, mag, b2  ABORTIVE: Fioricet, aleve, tylenol, Excedrin    Headache are worse if the patient: none  Headache trigger: not sure    How often do the headaches occur -   Feb: 8 out of 13 days - trigger weather and menstruation  March: 6 headaches - she had dizziness with this and states she had nausea and felt as if her worlds was spinning   She did have 5 days of dizziness with no headaches she states she has the feeling as if the world was moving and at time has problem with her balance  April: 13 headaches and  ? She thinks that during this month that was secondary to whether and allergies  She takes aleve which she states   Works 90% of the time  her headaches that get up to 9/10 comes on suddenly and gets worse quickly  What time of the day do the headaches start - anytime  How long do the headaches last - typically lasts for few days  In November she had it for 3 weeks  Where are they located - right frontal  What is the intensity of pain - 1 to 10/10, over the last one year she had one that was severe and lasted for days  Any warning prior to headache and how long do they last -  None  Describe your usual headache -  Pressure and throbbing at time  Associated symptoms:   · Decrease of appetite, nausea  · Photophobia, phonophobia  · Problem with concentration  · light-headed or dizzy  · prefer to be alone and in a dark room, unable to work       ROS reviewed    The following portions of the patient's history were reviewed and updated as appropriate: allergies, current medications, past family history, past medical history, past social history, past surgical history and problem list     Objective:    Blood pressure 124/65, pulse 85, height 5' (1 524 m), weight 72 6 kg (160 lb)  Physical Exam   Constitutional: She appears well-developed  Eyes: EOM are normal  Pupils are equal, round, and reactive to light  Neck: Normal range of motion  Neck supple  Cardiovascular: Normal rate  Pulmonary/Chest: Effort normal    Abdominal: Soft  Musculoskeletal: Normal range of motion  Neurological: She has normal strength and normal reflexes  Gait and coordination normal    Skin: Skin is warm  Psychiatric: She has a normal mood and affect  Her speech is normal        Neurological Exam    Mental Status  The patient is alert   Her speech is normal  Her language is fluent with no aphasia  She has normal attention span and concentration  Cranial Nerves    CN II: The patient's visual acuity and visual fields are normal   CN III, IV, VI: The patient's pupils are equally round and reactive to light and ocular movements are normal   CN V: The patient has normal facial sensation  CN VII:  The patient has symmetric facial movement  CN VIII:  The patient's hearing is normal   CN IX, X: The patient has symmetric palate movement and normal gag reflex  CN XI: The patient's shoulder shrug strength is normal   CN XII: The patient's tongue is midline without atrophy or fasciculations  Motor  The patient has normal muscle bulk throughout  Her overall muscle tone is normal throughout  Her strength is 5/5 throughout all four extremities  Sensory  The patient's sensation is normal in all four extremities  Reflexes  Deep tendon reflexes are 2+ and symmetric in all four extremities with downgoing toes bilaterally  Gait and Coordination  The patient has normal gait and station and normal casual, toe, heel, and tandem gait  She has normal coordination bilaterally  ROS:  Review of Systems   Constitutional: Negative  HENT: Negative  Eyes: Negative  Respiratory: Negative  Cardiovascular: Negative  Gastrointestinal: Negative  Endocrine: Negative  Genitourinary: Negative  Musculoskeletal: Negative  Skin: Negative  Allergic/Immunologic: Negative  Neurological: Positive for headaches  Hematological: Negative  Psychiatric/Behavioral: Negative

## 2018-05-04 NOTE — PATIENT INSTRUCTIONS
Preventive therapy for migraine headaches:  -  Will have patient take verapamil 40 mg half a tab twice a day  -   Continue venlafaxine 150 mg once a day  -   For her whether related headaches and headaches that last more than 24 hr she has to take cyproheptadine 4 mg at bedtime  Abortive therapy for migraine headaches:  -  At the onset of a migraine headache she is to take Aleve which works 90% the time for her  she is to take vitamin D3 2000 international units on daily basis

## 2018-06-20 ENCOUNTER — OFFICE VISIT (OUTPATIENT)
Dept: FAMILY MEDICINE CLINIC | Facility: CLINIC | Age: 43
End: 2018-06-20
Payer: COMMERCIAL

## 2018-06-20 VITALS
TEMPERATURE: 97 F | HEART RATE: 68 BPM | RESPIRATION RATE: 14 BRPM | SYSTOLIC BLOOD PRESSURE: 138 MMHG | BODY MASS INDEX: 31.22 KG/M2 | HEIGHT: 60 IN | DIASTOLIC BLOOD PRESSURE: 80 MMHG | WEIGHT: 159 LBS

## 2018-06-20 DIAGNOSIS — F41.9 ANXIETY: Primary | ICD-10-CM

## 2018-06-20 DIAGNOSIS — L25.8 CONTACT DERMATITIS DUE TO OTHER AGENT, UNSPECIFIED CONTACT DERMATITIS TYPE: ICD-10-CM

## 2018-06-20 DIAGNOSIS — G43.109 VERTIGINOUS MIGRAINE: ICD-10-CM

## 2018-06-20 PROCEDURE — 99214 OFFICE O/P EST MOD 30 MIN: CPT | Performed by: FAMILY MEDICINE

## 2018-06-20 RX ORDER — VENLAFAXINE HYDROCHLORIDE 37.5 MG/1
37.5 CAPSULE, EXTENDED RELEASE ORAL DAILY
Qty: 30 CAPSULE | Refills: 1 | Status: SHIPPED | OUTPATIENT
Start: 2018-06-20 | End: 2018-08-07 | Stop reason: SDUPTHER

## 2018-06-20 NOTE — PROGRESS NOTES
Assessment/Plan:     Diagnoses and all orders for this visit:    Anxiety  -     venlafaxine (EFFEXOR-XR) 37 5 mg 24 hr capsule; Take 1 capsule (37 5 mg total) by mouth daily    Contact dermatitis due to other agent, unspecified contact dermatitis type    Vertiginous migraine      we discussed titrating down her venlafaxine  She was up at 150 she went down to 75 on her own  She would like to go further down in taper to off as she is feeling very well at this time  We will decrease to 37 5 mg daily for 1 month and if she is tolerating at this we will go to 37 5 every other day for 2 weeks and then taper to stop  Reports an IV is minor will use cortisone cream and discussed proper cleaning and washing techniques to get the resin off her skin  She can follow up as needed  She still needs to follow with Neurology    Subjective:     Chief Complaint   Patient presents with    Follow-up     patient would like to discuss getting off the effexor XR  Patient ID: Keisha Martinez is a 43 y o  female  Patient is here today for checkup  She states ever since starting of vitamin program her migraines have resolved  She is currently on venlafaxine she was bumped up to 150 but felt it was too strong she is currently on 75 mg but would like to taper to off as she is feeling very well  She is also complaining of some poison ivy issues on her fingers and hands        The following portions of the patient's history were reviewed and updated as appropriate: allergies, current medications, past family history, past medical history, past social history, past surgical history and problem list     Review of Systems   Constitutional: Negative  HENT: Negative  Eyes: Negative  Respiratory: Negative  Cardiovascular: Negative  Gastrointestinal: Negative  Endocrine: Negative  Genitourinary: Negative  Musculoskeletal: Negative  Skin: Negative  Allergic/Immunologic: Negative  Neurological: Negative  Hematological: Negative  Psychiatric/Behavioral: Negative  All other systems reviewed and are negative  Objective:    Vitals:    06/20/18 0802   BP: 138/80   BP Location: Right arm   Patient Position: Sitting   Cuff Size: Standard   Pulse: 68   Resp: 14   Temp: (!) 97 °F (36 1 °C)   TempSrc: Tympanic   Weight: 72 1 kg (159 lb)   Height: 5' (1 524 m)          Physical Exam   Constitutional: She is oriented to person, place, and time  She appears well-developed and well-nourished  HENT:   Head: Normocephalic and atraumatic  Right Ear: External ear normal    Left Ear: External ear normal    Mouth/Throat: Oropharynx is clear and moist    Eyes: Conjunctivae and EOM are normal  Pupils are equal, round, and reactive to light  Neck: Normal range of motion  Cardiovascular: Normal rate, regular rhythm and normal heart sounds  Pulmonary/Chest: Effort normal and breath sounds normal    Abdominal: Soft  Bowel sounds are normal    Musculoskeletal: Normal range of motion  Neurological: She is alert and oriented to person, place, and time  She has normal reflexes  Skin: Skin is warm and dry  Psychiatric: She has a normal mood and affect  Her behavior is normal  Judgment and thought content normal    Nursing note and vitals reviewed

## 2018-07-19 DIAGNOSIS — F41.9 ANXIETY: ICD-10-CM

## 2018-07-19 RX ORDER — VENLAFAXINE HYDROCHLORIDE 37.5 MG/1
CAPSULE, EXTENDED RELEASE ORAL
Qty: 30 CAPSULE | OUTPATIENT
Start: 2018-07-19

## 2018-07-26 ENCOUNTER — OFFICE VISIT (OUTPATIENT)
Dept: FAMILY MEDICINE CLINIC | Facility: CLINIC | Age: 43
End: 2018-07-26
Payer: COMMERCIAL

## 2018-07-26 VITALS
SYSTOLIC BLOOD PRESSURE: 130 MMHG | WEIGHT: 157 LBS | DIASTOLIC BLOOD PRESSURE: 80 MMHG | BODY MASS INDEX: 30.82 KG/M2 | HEART RATE: 78 BPM | HEIGHT: 60 IN

## 2018-07-26 DIAGNOSIS — S39.012A BACK STRAIN, INITIAL ENCOUNTER: Primary | ICD-10-CM

## 2018-07-26 PROCEDURE — 3008F BODY MASS INDEX DOCD: CPT | Performed by: FAMILY MEDICINE

## 2018-07-26 PROCEDURE — 99213 OFFICE O/P EST LOW 20 MIN: CPT | Performed by: FAMILY MEDICINE

## 2018-07-26 RX ORDER — PREDNISONE 10 MG/1
TABLET ORAL
Qty: 21 TABLET | Refills: 0 | Status: SHIPPED | OUTPATIENT
Start: 2018-07-26 | End: 2019-01-08 | Stop reason: ALTCHOICE

## 2018-07-26 RX ORDER — CYCLOBENZAPRINE HCL 5 MG
5 TABLET ORAL
Qty: 10 TABLET | Refills: 0 | Status: SHIPPED | OUTPATIENT
Start: 2018-07-26 | End: 2019-01-08 | Stop reason: ALTCHOICE

## 2018-07-26 NOTE — PROGRESS NOTES
Assessment/Plan:       Diagnoses and all orders for this visit:    Back strain, initial encounter  -     predniSONE 10 mg tablet; 6 tabs on Day 1,5 tabs on Day 2,4 tabs on Day 3,3 tabs on Day 4,2 tabs on  Day 5 And 1 tab on Day 6  -     cyclobenzaprine (FLEXERIL) 5 mg tablet; Take 1 tablet (5 mg total) by mouth daily at bedtime  -     Ambulatory referral to Physical Therapy; Future      discussed her back strain  Will do prednisone  Flexeril at night  Heating pad as directed  And if not better script for physical therapy was given      Subjective:     Chief Complaint   Patient presents with    Back Pain     x3 days        Patient ID: Nia Portillo is a 43 y o  female  Patient states that 1 week ago she was bending over to pick something up and could not get back up again  For the past few days she is unable to sleep and the pain continues to be on the right side of her low back sometimes gets worse the pain becomes severe other times she is able to function        The following portions of the patient's history were reviewed and updated as appropriate: allergies, current medications, past family history, past medical history, past social history, past surgical history and problem list     Review of Systems   Constitutional: Negative  HENT: Negative  Eyes: Negative  Respiratory: Negative  Cardiovascular: Negative  Gastrointestinal: Negative  Endocrine: Negative  Genitourinary: Negative  Musculoskeletal: Positive for back pain  Skin: Negative  Allergic/Immunologic: Negative  Neurological: Negative  Hematological: Negative  Psychiatric/Behavioral: Negative  All other systems reviewed and are negative          Objective:    Vitals:    07/26/18 1054   BP: 130/80   BP Location: Right arm   Patient Position: Sitting   Cuff Size: Standard   Pulse: 78   Weight: 71 2 kg (157 lb)   Height: 5' (1 524 m)          Physical Exam   Constitutional: She is oriented to person, place, and time  She appears well-developed and well-nourished  HENT:   Head: Normocephalic and atraumatic  Right Ear: External ear normal    Left Ear: External ear normal    Mouth/Throat: Oropharynx is clear and moist    Eyes: Conjunctivae and EOM are normal  Pupils are equal, round, and reactive to light  Neck: Normal range of motion  Cardiovascular: Normal rate, regular rhythm and normal heart sounds  Pulmonary/Chest: Effort normal and breath sounds normal    Abdominal: Soft  Bowel sounds are normal    Musculoskeletal: Normal range of motion  Very tight spasm to right-sided paraspinal musculature   Neurological: She is alert and oriented to person, place, and time  She has normal reflexes  Skin: Skin is warm and dry  Psychiatric: She has a normal mood and affect  Her behavior is normal  Judgment and thought content normal    Nursing note and vitals reviewed

## 2018-08-07 DIAGNOSIS — F41.9 ANXIETY: ICD-10-CM

## 2018-08-07 RX ORDER — VENLAFAXINE HYDROCHLORIDE 37.5 MG/1
37.5 CAPSULE, EXTENDED RELEASE ORAL EVERY OTHER DAY
Qty: 90 CAPSULE | Refills: 1 | Status: SHIPPED | OUTPATIENT
Start: 2018-08-07 | End: 2018-08-17 | Stop reason: SDUPTHER

## 2018-08-07 NOTE — TELEPHONE ENCOUNTER
PT NEEDS CALL BACK EFFEXOR 37 5 MG  NEEDS TO BE CALLED IN TO [facility] BECAUSE TAKING IT EVERY 36 HOURS IS NOT WORKING HER BODY IS NOT HAPPY PT NEEDS MORE THAN TAKING IT 36 HOURS ANY QUESTIONS CALL [phone]

## 2018-08-14 ENCOUNTER — TELEPHONE (OUTPATIENT)
Dept: FAMILY MEDICINE CLINIC | Facility: CLINIC | Age: 43
End: 2018-08-14

## 2018-08-14 NOTE — TELEPHONE ENCOUNTER
[facility] CALLED AND NEEDS CLARIFICATION ON VENLAFAXINE ER 37 5 MG CALL [phone]  [facility] # [phone]

## 2018-08-17 DIAGNOSIS — F41.9 ANXIETY: ICD-10-CM

## 2018-08-19 RX ORDER — VENLAFAXINE HYDROCHLORIDE 37.5 MG/1
37.5 CAPSULE, EXTENDED RELEASE ORAL EVERY OTHER DAY
Qty: 90 CAPSULE | Refills: 1 | Status: SHIPPED | OUTPATIENT
Start: 2018-08-19 | End: 2019-03-07 | Stop reason: SDUPTHER

## 2018-08-24 DIAGNOSIS — L23.7 POISON IVY: Primary | ICD-10-CM

## 2018-08-24 RX ORDER — METHYLPREDNISOLONE 4 MG/1
TABLET ORAL
Qty: 21 TABLET | Refills: 0 | Status: SHIPPED | OUTPATIENT
Start: 2018-08-24 | End: 2019-01-08 | Stop reason: ALTCHOICE

## 2018-10-01 NOTE — PROGRESS NOTES
Patient ID: Susan Heredia is a 43 y o  female  Assessment/Plan:    Vertiginous migraine  This has resolved  Not had an episode in some time    Migraine without aura and without status migrainosus, not intractable   Preventive therapy for migraine headaches:  -   Continue venlafaxine 37 5 mg once a day  -   For her weather related headaches and headaches that last more than 24 hr she has to take cyproheptadine 4 mg at bedtime  Abortive therapy for migraine headaches:  -  At the onset of a migraine headache she is to take Aleve which works 90% the time for her  Vitamin D deficiency  Will recheck level and if low will need to resume Vit D 3 OTC 2000 units         Problem List Items Addressed This Visit        Cardiovascular and Mediastinum    Migraine without aura and without status migrainosus, not intractable - Primary      Preventive therapy for migraine headaches:  -   Continue venlafaxine 37 5 mg once a day  -   For her weather related headaches and headaches that last more than 24 hr she has to take cyproheptadine 4 mg at bedtime  Abortive therapy for migraine headaches:  -  At the onset of a migraine headache she is to take Aleve which works 90% the time for her  Other    Tension headache    Vertiginous migraine     This has resolved  Not had an episode in some time         Vitamin D deficiency     Will recheck level and if low will need to resume Vit D 3 OTC 2000 units         Relevant Orders    Vitamin D 25 hydroxy           Subjective:    HPI  We had the pleasure of evaluating Tennille Conway in neurological Follow-up today   As you know,  she is a 43 y  o  right handed female  She is   She is here today for evaluation of her headaches      Migraine headaches without aura/Tension type headaches:   What medications do you take or have you taken for your headaches?    PREVENTIVE: venlafaxine, propranolol, mag, b2, verapamil, cyproheptadine  ABORTIVE: Fioricet, aleve, tylenol, Excedrin     Headache are worse if the patient: none  Headache trigger: not sure     How often do the headaches occur -   Feb: 8 out of 13 days - trigger weather and menstruation  March: 6 headaches - she had dizziness with this and states she had nausea and felt as if her worlds was spinning  She did have 5 days of dizziness with no headaches she states she has the feeling as if the world was moving and at time has problem with her balance  April: 13 headaches and  ? She thinks that during this month that was secondary to weather and allergies  Summer was good  No migraines  However since school started began having migraines again and had leaking air conditioner  Discovered black mold and thinks it has been treated  Had been taking 2 aleve since school started and now is fine  Works 90% of the time  her headaches that get up to 9/10 comes on suddenly and gets worse quickly  What time of the day do the headaches start - anytime  How long do the headaches last - typically lasts for few days  In November she had it for 3 weeks  Where are they located - right frontal  What is the intensity of pain - 1 to 10/10, over the last one year she had one that was severe and lasted for days  Any warning prior to headache and how long do they last -  None  Describe your usual headache -  Pressure and throbbing at time  Associated symptoms:   · Decrease of appetite, nausea  · Photophobia, phonophobia  · Problem with concentration  · light-headed or dizzy  · prefer to be alone and in a dark room, unable to work         The following portions of the patient's history were reviewed and updated as appropriate: allergies, current medications, past family history, past medical history, past social history, past surgical history and problem list          Objective:    Blood pressure 110/62, height 5' (1 524 m), weight 72 4 kg (159 lb 9 6 oz)      Physical Exam   Constitutional: She appears well-developed and well-nourished  HENT:   Head: Normocephalic and atraumatic  Eyes: Pupils are equal, round, and reactive to light  Lids are normal    Neck: Normal range of motion  Cardiovascular: Normal rate  Pulmonary/Chest: Effort normal    Musculoskeletal: Normal range of motion  Neurological: She has normal strength  Coordination normal    Reflex Scores:       Tricep reflexes are 2+ on the right side and 2+ on the left side  Brachioradialis reflexes are 2+ on the right side and 2+ on the left side  Patellar reflexes are 3+ on the right side and 3+ on the left side  Skin: Skin is warm and dry  Psychiatric: She has a normal mood and affect  Her speech is normal    Nursing note and vitals reviewed  Neurological Exam  Mental Status   Oriented to person, place, time and situation  Recent and remote memory are intact  Speech is normal  Language is fluent with no aphasia  Attention and concentration are normal     Cranial Nerves  CN II: Visual acuity is normal  Visual fields full to confrontation  CN III, IV, VI: Extraocular movements intact bilaterally  Normal lids and orbits bilaterally  Pupils equal round and reactive to light bilaterally  CN V: Facial sensation is normal   CN VII: Full and symmetric facial movement  CN VIII: Hearing is normal   CN IX, X: Palate elevates symmetrically  Normal gag reflex  CN XI: Shoulder shrug strength is normal   CN XII: Tongue midline without atrophy or fasciculations  Motor  Normal muscle bulk throughout  No fasciculations present  Normal muscle tone  Strength is 5/5 throughout all four extremities  Sensory  Sensation is intact to light touch, pinprick, vibration and proprioception in all four extremities      Reflexes                                           Right                      Left  Brachioradialis                    2+                         2+  Triceps                                2+                         2+  Patellar 3+                         3+    Coordination  Finger-to-nose, rapid alternating movements and heel-to-shin normal bilaterally without dysmetria  Gait  Normal casual, toe, heel and tandem gait  ROS:    Review of Systems   Constitutional: Negative  HENT: Negative  Eyes: Negative  Respiratory: Negative  Cardiovascular: Negative  Gastrointestinal: Negative  Endocrine: Negative  Genitourinary: Negative  Musculoskeletal: Negative  Skin: Negative  Allergic/Immunologic: Negative  Neurological: Positive for light-headedness and headaches  Hematological: Negative  Psychiatric/Behavioral: Negative

## 2018-10-05 ENCOUNTER — OFFICE VISIT (OUTPATIENT)
Dept: NEUROLOGY | Facility: CLINIC | Age: 43
End: 2018-10-05
Payer: COMMERCIAL

## 2018-10-05 VITALS
HEIGHT: 60 IN | BODY MASS INDEX: 31.33 KG/M2 | DIASTOLIC BLOOD PRESSURE: 62 MMHG | WEIGHT: 159.6 LBS | SYSTOLIC BLOOD PRESSURE: 110 MMHG

## 2018-10-05 DIAGNOSIS — G44.209 TENSION HEADACHE: ICD-10-CM

## 2018-10-05 DIAGNOSIS — E55.9 VITAMIN D DEFICIENCY: ICD-10-CM

## 2018-10-05 DIAGNOSIS — G43.109 VERTIGINOUS MIGRAINE: ICD-10-CM

## 2018-10-05 DIAGNOSIS — G43.009 MIGRAINE WITHOUT AURA AND WITHOUT STATUS MIGRAINOSUS, NOT INTRACTABLE: Primary | ICD-10-CM

## 2018-10-05 PROCEDURE — 99214 OFFICE O/P EST MOD 30 MIN: CPT | Performed by: PHYSICIAN ASSISTANT

## 2018-10-05 NOTE — PATIENT INSTRUCTIONS
Preventive therapy for migraine headaches:  -   Continue venlafaxine 37 5 mg once a day  -   For her weather related headaches and headaches that last more than 24 hr she has to take cyproheptadine 4 mg at bedtime  Abortive therapy for migraine headaches:  -  At the onset of a migraine headache she is to take Aleve which works 90% the time for her      We will check your vit D

## 2018-10-15 LAB — 25(OH)D3+25(OH)D2 SERPL-MCNC: 31.1 NG/ML (ref 30–100)

## 2019-01-08 ENCOUNTER — OFFICE VISIT (OUTPATIENT)
Dept: FAMILY MEDICINE CLINIC | Facility: CLINIC | Age: 44
End: 2019-01-08
Payer: COMMERCIAL

## 2019-01-08 VITALS
DIASTOLIC BLOOD PRESSURE: 80 MMHG | BODY MASS INDEX: 30.96 KG/M2 | HEART RATE: 66 BPM | HEIGHT: 61 IN | TEMPERATURE: 99.3 F | WEIGHT: 164 LBS | SYSTOLIC BLOOD PRESSURE: 128 MMHG

## 2019-01-08 DIAGNOSIS — J32.9 SINUSITIS, UNSPECIFIED CHRONICITY, UNSPECIFIED LOCATION: ICD-10-CM

## 2019-01-08 DIAGNOSIS — L02.91 ABSCESS: Primary | ICD-10-CM

## 2019-01-08 PROCEDURE — 1036F TOBACCO NON-USER: CPT | Performed by: FAMILY MEDICINE

## 2019-01-08 PROCEDURE — 99213 OFFICE O/P EST LOW 20 MIN: CPT | Performed by: FAMILY MEDICINE

## 2019-01-08 PROCEDURE — 3008F BODY MASS INDEX DOCD: CPT | Performed by: FAMILY MEDICINE

## 2019-01-08 RX ORDER — SULFAMETHOXAZOLE AND TRIMETHOPRIM 800; 160 MG/1; MG/1
1 TABLET ORAL EVERY 12 HOURS SCHEDULED
Qty: 14 TABLET | Refills: 0 | Status: SHIPPED | OUTPATIENT
Start: 2019-01-08 | End: 2019-01-15

## 2019-01-08 NOTE — PROGRESS NOTES
Assessment/Plan:     Diagnoses and all orders for this visit:    Abscess  -     sulfamethoxazole-trimethoprim (BACTRIM DS) 800-160 mg per tablet; Take 1 tablet by mouth every 12 (twelve) hours for 7 days    Sinusitis, unspecified chronicity, unspecified location      Bactrim ordered for leg lesion  This will also cover her sinuses  She take over-the-counter cold meds  She can follow up as needed      Subjective:     Chief Complaint   Patient presents with    Rash     spot behind left knee x 1 month    Cold Like Symptoms        Patient ID: Franklin Belcher is a 37 y o  female  Patient is here today for multiple issues  She states that she had a painful bump on the back of her knee that looks exactly like the staph infection she had last year  She states it opened up bruised and bled but is still somewhat present  He is also complaining of upper respiratory symptoms for the past week  Feels a lot of congestion and pressure in her sinuses        The following portions of the patient's history were reviewed and updated as appropriate: allergies, current medications, past family history, past medical history, past social history, past surgical history and problem list     Review of Systems   Constitutional: Negative  HENT: Negative  Eyes: Negative  Respiratory: Negative  Cardiovascular: Negative  Gastrointestinal: Negative  Endocrine: Negative  Genitourinary: Negative  Musculoskeletal: Negative  Skin: Positive for wound  Allergic/Immunologic: Negative  Neurological: Negative  Hematological: Negative  Psychiatric/Behavioral: Negative  All other systems reviewed and are negative          Objective:    Vitals:    01/08/19 1126   BP: 128/80   BP Location: Left arm   Patient Position: Sitting   Cuff Size: Standard   Pulse: 66   Temp: 99 3 °F (37 4 °C)   Weight: 74 4 kg (164 lb)   Height: 5' 1" (1 549 m)          Physical Exam   Constitutional: She is oriented to person, place, and time  She appears well-developed and well-nourished  HENT:   Head: Normocephalic and atraumatic  Right Ear: External ear normal    Left Ear: External ear normal    Mouth/Throat: Oropharynx is clear and moist    +nasal congestion and pressure to sinuses   Eyes: Pupils are equal, round, and reactive to light  Conjunctivae and EOM are normal    Neck: Normal range of motion  Cardiovascular: Normal rate, regular rhythm and normal heart sounds  Pulmonary/Chest: Effort normal and breath sounds normal    Abdominal: Soft  Bowel sounds are normal    Musculoskeletal: Normal range of motion  Neurological: She is alert and oriented to person, place, and time  She has normal reflexes  Skin: Skin is warm and dry  Pustule like lesion on back of left knee  That is open and not currently draining but red and tender   Psychiatric: She has a normal mood and affect  Her behavior is normal  Judgment and thought content normal    Nursing note and vitals reviewed

## 2019-01-28 DIAGNOSIS — F41.9 ANXIETY: ICD-10-CM

## 2019-01-28 RX ORDER — VENLAFAXINE HYDROCHLORIDE 37.5 MG/1
CAPSULE, EXTENDED RELEASE ORAL
Qty: 90 CAPSULE | Refills: 1 | OUTPATIENT
Start: 2019-01-28

## 2019-03-07 DIAGNOSIS — F41.9 ANXIETY: ICD-10-CM

## 2019-03-07 RX ORDER — VENLAFAXINE HYDROCHLORIDE 37.5 MG/1
37.5 CAPSULE, EXTENDED RELEASE ORAL EVERY OTHER DAY
Qty: 90 CAPSULE | Refills: 1 | Status: SHIPPED | OUTPATIENT
Start: 2019-03-07 | End: 2019-03-14 | Stop reason: SDUPTHER

## 2019-03-14 DIAGNOSIS — F41.9 ANXIETY: ICD-10-CM

## 2019-03-14 RX ORDER — VENLAFAXINE HYDROCHLORIDE 37.5 MG/1
37.5 CAPSULE, EXTENDED RELEASE ORAL DAILY
Qty: 90 CAPSULE | Refills: 1 | Status: SHIPPED | OUTPATIENT
Start: 2019-03-14 | End: 2019-03-14 | Stop reason: SDUPTHER

## 2019-03-14 RX ORDER — VENLAFAXINE HYDROCHLORIDE 37.5 MG/1
37.5 CAPSULE, EXTENDED RELEASE ORAL DAILY
Qty: 7 CAPSULE | Refills: 0 | Status: SHIPPED | OUTPATIENT
Start: 2019-03-14 | End: 2019-09-09 | Stop reason: SDUPTHER

## 2019-06-03 ENCOUNTER — OFFICE VISIT (OUTPATIENT)
Dept: FAMILY MEDICINE CLINIC | Facility: CLINIC | Age: 44
End: 2019-06-03
Payer: COMMERCIAL

## 2019-06-03 VITALS
DIASTOLIC BLOOD PRESSURE: 84 MMHG | BODY MASS INDEX: 29.15 KG/M2 | WEIGHT: 154.4 LBS | TEMPERATURE: 98.8 F | OXYGEN SATURATION: 99 % | HEIGHT: 61 IN | HEART RATE: 78 BPM | SYSTOLIC BLOOD PRESSURE: 126 MMHG

## 2019-06-03 DIAGNOSIS — M62.838 MUSCLE SPASM: ICD-10-CM

## 2019-06-03 DIAGNOSIS — R03.0 ELEVATED BP WITHOUT DIAGNOSIS OF HYPERTENSION: ICD-10-CM

## 2019-06-03 DIAGNOSIS — F41.0 PANIC ATTACKS: Primary | ICD-10-CM

## 2019-06-03 PROCEDURE — 99214 OFFICE O/P EST MOD 30 MIN: CPT | Performed by: FAMILY MEDICINE

## 2019-06-03 PROCEDURE — 1036F TOBACCO NON-USER: CPT | Performed by: FAMILY MEDICINE

## 2019-06-03 PROCEDURE — 3008F BODY MASS INDEX DOCD: CPT | Performed by: FAMILY MEDICINE

## 2019-06-03 RX ORDER — ALBUTEROL SULFATE 90 MCG
2 HFA AEROSOL WITH ADAPTER (GRAM) INHALATION AS NEEDED
COMMUNITY
Start: 2016-09-12

## 2019-06-03 RX ORDER — DIAZEPAM 2 MG/1
2 TABLET ORAL DAILY
Qty: 30 TABLET | Refills: 0 | Status: SHIPPED | OUTPATIENT
Start: 2019-06-03 | End: 2020-02-06 | Stop reason: ALTCHOICE

## 2019-06-26 ENCOUNTER — APPOINTMENT (OUTPATIENT)
Dept: URGENT CARE | Facility: CLINIC | Age: 44
End: 2019-06-26
Payer: COMMERCIAL

## 2019-06-26 PROCEDURE — 99213 OFFICE O/P EST LOW 20 MIN: CPT

## 2019-07-11 ENCOUNTER — TELEPHONE (OUTPATIENT)
Dept: FAMILY MEDICINE CLINIC | Facility: CLINIC | Age: 44
End: 2019-07-11

## 2019-07-16 NOTE — TELEPHONE ENCOUNTER
Called and discussed  She was not happy with Neurology saw nasal 10  She would like to see [facility] and has appointment October  I told her if her headaches persist or get worse will try to see if we get an appointment sooner

## 2019-07-17 ENCOUNTER — OFFICE VISIT (OUTPATIENT)
Dept: URGENT CARE | Facility: CLINIC | Age: 44
End: 2019-07-17
Payer: COMMERCIAL

## 2019-07-17 VITALS
HEART RATE: 72 BPM | OXYGEN SATURATION: 99 % | DIASTOLIC BLOOD PRESSURE: 74 MMHG | RESPIRATION RATE: 16 BRPM | SYSTOLIC BLOOD PRESSURE: 126 MMHG | WEIGHT: 150 LBS | BODY MASS INDEX: 29.45 KG/M2 | TEMPERATURE: 97.2 F | HEIGHT: 60 IN
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
## 2019-07-17 DIAGNOSIS — S61.451A CAT BITE OF RIGHT HAND, INITIAL ENCOUNTER: Primary | ICD-10-CM

## 2019-07-17 PROCEDURE — 90715 TDAP VACCINE 7 YRS/> IM: CPT

## 2019-07-17 PROCEDURE — 90471 IMMUNIZATION ADMIN: CPT | Performed by: EMERGENCY MEDICINE

## 2019-07-17 PROCEDURE — 99213 OFFICE O/P EST LOW 20 MIN: CPT | Performed by: EMERGENCY MEDICINE

## 2019-07-17 RX ORDER — AMOXICILLIN AND CLAVULANATE POTASSIUM 875; 125 MG/1; MG/1
1 TABLET, FILM COATED ORAL 2 TIMES DAILY
Qty: 20 TABLET | Refills: 0 | Status: SHIPPED | OUTPATIENT
Start: 2019-07-17 | End: 2019-07-27

## 2019-07-17 NOTE — PROGRESS NOTES
Assessment/Plan:    No problem-specific Assessment & Plan notes found for this encounter  Diagnoses and all orders for this visit:    Cat bite of right hand, initial encounter  -     TDAP Vaccine greater than or equal to 6yo  -     amoxicillin-clavulanate (AUGMENTIN) 875-125 mg per tablet; Take 1 tablet by mouth 2 (two) times a day for 10 days          Subjective:      Patient ID: Clarice Silverman is a 37 y o  female  Bitten by own cat on R thumb, thenar eminence; 2 punctures noted; area swollen, tender; occurred at 0900    Cat Bite   This is a new problem  The current episode started today  The problem occurs constantly  The problem has been unchanged  Associated symptoms include arthralgias (R thumb)  Nothing aggravates the symptoms  She has tried nothing for the symptoms  The treatment provided no relief  The following portions of the patient's history were reviewed and updated as appropriate: allergies, current medications, past family history, past medical history, past social history, past surgical history and problem list     Review of Systems   Musculoskeletal: Positive for arthralgias (R thumb)  All other systems reviewed and are negative  Objective:      /74   Pulse 72   Temp (!) 97 2 °F (36 2 °C)   Resp 16   Ht 5' (1 524 m)   Wt 68 kg (150 lb)   SpO2 99%   BMI 29 29 kg/m²          Physical Exam   Constitutional: She is oriented to person, place, and time  She appears well-developed and well-nourished  HENT:   Nose: Nose normal    Eyes: Pupils are equal, round, and reactive to light  Neck: Normal range of motion  Cardiovascular: Normal rate  Pulmonary/Chest: Effort normal    Abdominal: Soft  Musculoskeletal:        Hands:  Neurological: She is alert and oriented to person, place, and time  Skin: Skin is warm and dry  Psychiatric: She has a normal mood and affect   Her behavior is normal  Judgment and thought content normal    Nursing note and vitals reviewed

## 2019-07-30 DIAGNOSIS — L23.7 POISON IVY: Primary | ICD-10-CM

## 2019-07-30 RX ORDER — METHYLPREDNISOLONE 4 MG/1
TABLET ORAL
Qty: 21 EACH | Refills: 0 | Status: SHIPPED | OUTPATIENT
Start: 2019-07-30 | End: 2020-02-06 | Stop reason: ALTCHOICE

## 2019-08-29 DIAGNOSIS — F41.9 ANXIETY: ICD-10-CM

## 2019-08-29 RX ORDER — VENLAFAXINE HYDROCHLORIDE 37.5 MG/1
CAPSULE, EXTENDED RELEASE ORAL
Qty: 90 CAPSULE | Refills: 4 | OUTPATIENT
Start: 2019-08-29

## 2019-09-05 ENCOUNTER — TELEPHONE (OUTPATIENT)
Dept: NEUROLOGY | Facility: CLINIC | Age: 44
End: 2019-09-05

## 2019-09-09 DIAGNOSIS — F41.9 ANXIETY: ICD-10-CM

## 2019-09-09 RX ORDER — VENLAFAXINE HYDROCHLORIDE 37.5 MG/1
37.5 CAPSULE, EXTENDED RELEASE ORAL DAILY
Qty: 90 CAPSULE | Refills: 1 | Status: SHIPPED | OUTPATIENT
Start: 2019-09-09 | End: 2019-11-27 | Stop reason: SDUPTHER

## 2019-10-07 ENCOUNTER — OFFICE VISIT (OUTPATIENT)
Dept: FAMILY MEDICINE CLINIC | Facility: CLINIC | Age: 44
End: 2019-10-07
Payer: COMMERCIAL

## 2019-10-07 VITALS
SYSTOLIC BLOOD PRESSURE: 124 MMHG | BODY MASS INDEX: 26.78 KG/M2 | OXYGEN SATURATION: 99 % | HEART RATE: 98 BPM | WEIGHT: 136.4 LBS | DIASTOLIC BLOOD PRESSURE: 82 MMHG | HEIGHT: 60 IN | TEMPERATURE: 99 F

## 2019-10-07 DIAGNOSIS — R05.9 COUGH: ICD-10-CM

## 2019-10-07 DIAGNOSIS — S09.90XA INJURY OF HEAD, INITIAL ENCOUNTER: Primary | ICD-10-CM

## 2019-10-07 PROCEDURE — 3008F BODY MASS INDEX DOCD: CPT | Performed by: FAMILY MEDICINE

## 2019-10-07 PROCEDURE — 99213 OFFICE O/P EST LOW 20 MIN: CPT | Performed by: FAMILY MEDICINE

## 2019-10-07 NOTE — PROGRESS NOTES
Assessment/Plan:     Diagnoses and all orders for this visit:    Injury of head, initial encounter  -     Ambulatory referral to Physical Therapy; Future    Cough      no treatment need for cough  Will refer to physical therapy for head injury concussion treatment  F/u as needed      Subjective:     Chief Complaint   Patient presents with    Cough     x4 days         Patient ID: Gerhardt Kitten is a 37 y o  female  Here for cough  States recently suffered a head injury  Her  is still in the neuro ICU  She is still having some mild old fogginess but otherwise feels oka  She has been very stressed with her 's current condition  She states her cough is productive in mostly in the morning      The following portions of the patient's history were reviewed and updated as appropriate: allergies, current medications, past family history, past medical history, past social history, past surgical history and problem list     Review of Systems   Constitutional: Negative  HENT: Negative  Eyes: Negative  Respiratory: Positive for cough  Cardiovascular: Negative  Gastrointestinal: Negative  Endocrine: Negative  Genitourinary: Negative  Musculoskeletal: Negative  Skin: Negative  Allergic/Immunologic: Negative  Neurological: Negative  Hematological: Negative  Psychiatric/Behavioral: Negative  All other systems reviewed and are negative  Objective:    Vitals:    10/07/19 1019   BP: 124/82   BP Location: Left arm   Patient Position: Sitting   Cuff Size: Large   Pulse: 98   Temp: 99 °F (37 2 °C)   TempSrc: Tympanic   SpO2: 99%   Weight: 61 9 kg (136 lb 6 4 oz)   Height: 5' (1 524 m)          Physical Exam   Constitutional: She is oriented to person, place, and time  She appears well-developed and well-nourished  HENT:   Head: Normocephalic and atraumatic     Right Ear: External ear normal    Left Ear: External ear normal    Mouth/Throat: Oropharynx is clear and moist    Eyes: Pupils are equal, round, and reactive to light  Conjunctivae and EOM are normal    Neck: Normal range of motion  Cardiovascular: Normal rate, regular rhythm and normal heart sounds  Pulmonary/Chest: Effort normal and breath sounds normal    Abdominal: Soft  Bowel sounds are normal    Musculoskeletal: Normal range of motion  Neurological: She is alert and oriented to person, place, and time  She has normal reflexes  Skin: Skin is warm and dry  Psychiatric: She has a normal mood and affect  Her behavior is normal  Judgment and thought content normal    Nursing note and vitals reviewed

## 2019-10-10 ENCOUNTER — EVALUATION (OUTPATIENT)
Dept: PHYSICAL THERAPY | Facility: CLINIC | Age: 44
End: 2019-10-10
Payer: COMMERCIAL

## 2019-10-10 DIAGNOSIS — S09.90XD INJURY OF HEAD, SUBSEQUENT ENCOUNTER: Primary | ICD-10-CM

## 2019-10-10 DIAGNOSIS — F07.81 POST CONCUSSION SYNDROME: ICD-10-CM

## 2019-10-10 PROCEDURE — 97161 PT EVAL LOW COMPLEX 20 MIN: CPT | Performed by: PHYSICAL THERAPIST

## 2019-10-10 PROCEDURE — 97140 MANUAL THERAPY 1/> REGIONS: CPT | Performed by: PHYSICAL THERAPIST

## 2019-10-10 PROCEDURE — G8979 MOBILITY GOAL STATUS: HCPCS | Performed by: PHYSICAL THERAPIST

## 2019-10-10 PROCEDURE — G8978 MOBILITY CURRENT STATUS: HCPCS | Performed by: PHYSICAL THERAPIST

## 2019-10-10 NOTE — PROGRESS NOTES
PT Evaluation     Today's date: 10/10/2019  Patient name: Sarina Alcantara  : 1975  MRN: [id number]  Referring provider: Lawson Brower DO  Dx:   Encounter Diagnosis     ICD-10-CM    1  Injury of head, subsequent encounter S09  90XD    2  Post concussion syndrome F07 81                   Assessment  Assessment details: Patient presents with post concussion syndrome from hitting her head on 19  Demonstrates abnormal convergence, increase sway index with m-CTSIB, and decrease cervical ROM  Pt notes relief in brain fog with suboccipital release and mobs to right C1  Rima Drain presents with the impairments as listed above and would benefit from skilled Vestibular Rehab in Physical Therapy to address these impairments in order to maximize functional capacity and return to prior level of function  Thank you for this referral!    Pt also referred to chiropractor to adjust C1 due to increase in headache and brain fog with performing cervical flexion  Impairments: activity intolerance, impaired balance and impaired physical strength    Goals  Impairment Goals:  1  No headache with convergence test in 8-12 weeks   2  Improved sway index to <1 00 with EO firm in 8-12 weeks    Functional Goals  1  Patient is able to read for 1 hour without headache in 8 weeks  2   Patient is able to drive without difficulty focusing or headache in 8 weeks    Plan  Patient would benefit from: PT eval and skilled physical therapy  Planned therapy interventions: manual therapy, home exercise program, neuromuscular re-education, therapeutic exercise, therapeutic training, therapeutic activities, postural training, patient education and balance  Frequency: 2x week  Duration in weeks: 8  Plan of Care expiration date: 2019  Treatment plan discussed with: patient        Subjective Evaluation    History of Present Illness  Mechanism of injury: 38 yo female presents with concussion from hitting head jumping off a moving golf cart on Sept 28th  Pt currently caring for her  who sustained a brain injury from falling off the same golf cart  Pt notes she is unsure what symptoms are from concussion vs stress of caring for her   Pt reports nausea for the first 3 days post concussion, but denies headaches  Pt notes brain fog and difficulty focusing on driving unless talking on the phone with someone  Pt notes she has lost 15lbs and feeling physical weak with difficulty helping to lift her   Pt notes difficulty with reading small print, but notes occasional difficulty prior to concussion  Pain  At best pain ratin  At worst pain ratin  Location: Cervical          Objective     Concurrent Complaints  Positive for nausea/motion sickness (nausea for 3 days) and poor concentration  Negative for headaches, tinnitus, visual change, hearing loss, memory loss, aural fullness and peripheral neuropathy    Active Range of Motion   Cervical/Thoracic Spine       Cervical    Flexion: 70 degrees   Extension: 35 (right suboccipital pain) degrees      Left lateral flexion: 35 (right side suboccipital) degrees      Right lateral flexion: 35 degrees      Left rotation: 56 degrees  Right rotation: 38 degrees           Additional Active Range of Motion Details  Pt noted increase in brain fog and feeling off when cervical flexion stretch in sitting  Pt notes relief in headache and brain fog with SOR and R UPA grade 2 mobs to C1  Neuro Exam:     Dizziness  Negative for disequilibrium, vertigo, oscillopsia, motion sickness, light-headedness, rocking or swaying, diplopia and floating or swimming  Exacerbating factors  Positive for supine to/from sitting (brief lightheaded from supine to sit)  Negative for bending over, rolling in bed, looking up, walking, turning head, optokinetic movement and walking in busy environment       Headaches   Patient reports headaches: No      Cervical exam   Ligament Laxity Testing   Alar ligament: WNL  Anita Martinez Brianda: WNL    Oculomotor exam   Oculomotor ROM: WNL  Resting nystagmus: not present   Gaze holding nystagmus: not present left  and not present right  Smooth pursuits: within normal limits  Convergence: left eye fatigued after 4 seconds  Convergence: abnormal  Cover test: normal  Crossover test: normal    Positional testing   Positional testing comment: Pt notes onset of 6/10 headache with EO on firm when focused on screen  Pt c/o 8/10 pressure over eyes with convergence testing lasting 1 minute           Balance assessments   MCTSIB   Eyes open level surface: 1 11  Eyes open foam surface: 2 70  Eyes closed level surface: 1 98  Eyes closed foam surface: 3 94      Flowsheet Rows      Most Recent Value   PT/OT G-Codes   Current Score  71   Projected Score  83   Assessment Type  Evaluation   G code set  Mobility: Walking & Moving Around   Mobility: Walking and Moving Around Current Status ()  CJ   Mobility: Walking and Moving Around Goal Status ()  CI             Precautions: anxiety      Manual  10/10            Cervical ROM             SOR CHRISTIN            R UPA C1 supine CHRISTIN            TPR to UT/rhomboid CHRISTIN                             Exercise Diary  10/10            Convergence             VOR x1             FTEO foam             FAEC foam                                                                                                                                                                                                                                 Modalities

## 2019-10-14 ENCOUNTER — OFFICE VISIT (OUTPATIENT)
Dept: PHYSICAL THERAPY | Facility: CLINIC | Age: 44
End: 2019-10-14
Payer: COMMERCIAL

## 2019-10-14 DIAGNOSIS — F07.81 POST CONCUSSION SYNDROME: ICD-10-CM

## 2019-10-14 DIAGNOSIS — S09.90XD INJURY OF HEAD, SUBSEQUENT ENCOUNTER: Primary | ICD-10-CM

## 2019-10-14 PROCEDURE — 97112 NEUROMUSCULAR REEDUCATION: CPT | Performed by: PHYSICAL THERAPIST

## 2019-10-14 PROCEDURE — G8979 MOBILITY GOAL STATUS: HCPCS | Performed by: PHYSICAL THERAPIST

## 2019-10-14 PROCEDURE — G8980 MOBILITY D/C STATUS: HCPCS | Performed by: PHYSICAL THERAPIST

## 2019-10-14 PROCEDURE — 97140 MANUAL THERAPY 1/> REGIONS: CPT | Performed by: PHYSICAL THERAPIST

## 2019-10-14 NOTE — PROGRESS NOTES
Daily Note     Today's date: 10/14/2019  Patient name: Jerry Long  : 1975  MRN: [id number]  Referring provider: Mar Arroyo DO  Dx:   Encounter Diagnosis     ICD-10-CM    1  Injury of head, subsequent encounter S09  90XD    2  Post concussion syndrome F07 81                   Subjective: Pt saw chiropractor as recommended on Thursday, who was able to realign C1  Pt noted neck pain on Friday and Saturday morning, but reports resolution of symptoms  Pt denies diffculty focusing with driving and able to perform all ADL and reading without difficulty, brain fog, or headache  Objective: See treatment diary below  m-CTSIB  EO firm: 0 70 (no headache)  EC firm: 1 30  EO foam: 0 75  EC foam: 1 60    Assessment: Demonstrates pain-free cervical ROM with no headache produced with cervical flexion as during IE  Convergence was normal  Demonstrates decrease in sway index without headache produced with EO firm  Pt denies headache and only brief dizziness with VORx1 standing against busy background  Based on patient's reported recovery following cervical adjustment on Thursday and no remaining symptoms, patient is cleared to return to work and monitor of symptoms return  Patient will return in 2 weeks for a follow-up with plans to discharge if remains symptom free  Plan: Potential discharge next visit  Addendum 19: Patient requested to be discharge reporting full recovery       Precautions: anxiety      Manual  10/10 10/14           Cervical ROM              [address]           R UPA C1 supine CHRISTIN            TPR to UT/rhomboid [address]                            Exercise Diary  10/10 10/14           Convergence  performed           VOR x1  30"x2  standing  Busy           FTEO foam  m-CSTIB           FAEC foam Modalities

## 2019-10-14 NOTE — LETTER
2019    Doreen Centeno   [address]  [address]  [facility] [address]    Patient: Yobani Mccall   YOB: 1975   Date of Visit: 10/14/2019     Encounter Diagnosis     ICD-10-CM    1  Injury of head, subsequent encounter S09  90XD    2  Post concussion syndrome F07 81        Dear Dr Zeus Antunez: Thank you for your recent referral of Yobani Mccall  She seems to have recovered fully following adjustment of her cervical spine by Dr Catherine Mercado  Please see copy of today's treatment note  I'm clearing patient to return to work as long as she is cleared by you  If you have any questions or concerns, please do not hesitate to call  I sincerely appreciate the opportunity to share in the care of one of your patients and hope to have another opportunity to work with you in the near future  Sincerely,    Gianni Waters, PT      Referring Provider:      I certify that I have read the below Plan of Care and certify the need for these services furnished under this plan of treatment while under my care  Doreen CentenoDO  [address]  [address]  [facility] [address]  [place]          Daily Note     Today's date: 10/14/2019  Patient name: Yobani Mccall  : 1975  MRN: [id number]  Referring provider: Javi Bingham DO  Dx:   Encounter Diagnosis     ICD-10-CM    1  Injury of head, subsequent encounter S09  90XD    2  Post concussion syndrome F07 81                   Subjective: Pt saw chiropractor as recommended on Thursday, who was able to realign C1  Pt noted neck pain on Friday and Saturday morning, but reports resolution of symptoms  Pt denies diffculty focusing with driving and able to perform all ADL and reading without difficulty, brain fog, or headache        Objective: See treatment diary below  m-CTSIB  EO firm: 0 70 (no headache)  EC firm: 1 30  EO foam: 0 75  EC foam: 1 60    Assessment: Demonstrates pain-free cervical ROM with no headache produced with cervical flexion as during IE  Convergence was normal  Demonstrates decrease in sway index without headache produced with EO firm  Pt denies headache and only brief dizziness with VORx1 standing against busy background  Based on patient's reported recovery following cervical adjustment on Thursday and no remaining symptoms, patient is cleared to return to work and monitor of symptoms return  Patient will return in 2 weeks for a follow-up with plans to discharge if remains symptom free  Plan: Potential discharge next visit  Addendum 11/27/19: Patient requested to be discharge reporting full recovery       Precautions: anxiety      Manual  10/10 10/14           Cervical ROM              [address]           R UPA C1 supine CHRISTIN            TPR to UT/rhomboid [address]                            Exercise Diary  10/10 10/14           Convergence  performed           VOR x1  30"x2  standing  Busy           FTEO foam  m-CSTIB           FAEC foam                                                                                                                                                                                                                                 Modalities

## 2019-10-17 ENCOUNTER — APPOINTMENT (OUTPATIENT)
Dept: PHYSICAL THERAPY | Facility: CLINIC | Age: 44
End: 2019-10-17
Payer: COMMERCIAL

## 2019-10-21 ENCOUNTER — APPOINTMENT (OUTPATIENT)
Dept: PHYSICAL THERAPY | Facility: CLINIC | Age: 44
End: 2019-10-21
Payer: COMMERCIAL

## 2019-10-24 ENCOUNTER — APPOINTMENT (OUTPATIENT)
Dept: PHYSICAL THERAPY | Facility: CLINIC | Age: 44
End: 2019-10-24
Payer: COMMERCIAL

## 2019-10-28 ENCOUNTER — APPOINTMENT (OUTPATIENT)
Dept: PHYSICAL THERAPY | Facility: CLINIC | Age: 44
End: 2019-10-28
Payer: COMMERCIAL

## 2019-10-31 ENCOUNTER — APPOINTMENT (OUTPATIENT)
Dept: PHYSICAL THERAPY | Facility: CLINIC | Age: 44
End: 2019-10-31
Payer: COMMERCIAL

## 2019-11-27 DIAGNOSIS — F41.9 ANXIETY: ICD-10-CM

## 2019-11-27 DIAGNOSIS — S09.90XD INJURY OF HEAD, SUBSEQUENT ENCOUNTER: Primary | ICD-10-CM

## 2019-11-27 RX ORDER — VENLAFAXINE HYDROCHLORIDE 75 MG/1
75 CAPSULE, EXTENDED RELEASE ORAL DAILY
Qty: 90 CAPSULE | Refills: 1 | Status: SHIPPED | OUTPATIENT
Start: 2019-11-27 | End: 2020-02-06 | Stop reason: SDUPTHER

## 2019-11-27 NOTE — PROGRESS NOTES
Assessment/Plan:    No problem-specific Assessment & Plan notes found for this encounter  Diagnoses and all orders for this visit:    Anxiety  -     venlafaxine (EFFEXOR-XR) 75 mg 24 hr capsule; Take 1 capsule (75 mg total) by mouth daily            Subjective:     No chief complaint on file  Patient ID: She Carlisle is a 37 y o  female  HPI    The following portions of the patient's history were reviewed and updated as appropriate: allergies, current medications, past family history, past medical history, past social history, past surgical history and problem list     Review of Systems   Constitutional: Negative  HENT: Negative  Eyes: Negative  Respiratory: Negative  Cardiovascular: Negative  Gastrointestinal: Negative  Endocrine: Negative  Genitourinary: Negative  Musculoskeletal: Negative  Skin: Negative  Allergic/Immunologic: Negative  Neurological: Negative  Hematological: Negative  Psychiatric/Behavioral: Negative  All other systems reviewed and are negative  Objective: There were no vitals filed for this visit  Physical Exam   Constitutional: She is oriented to person, place, and time  She appears well-developed and well-nourished  HENT:   Head: Normocephalic and atraumatic  Right Ear: External ear normal    Left Ear: External ear normal    Mouth/Throat: Oropharynx is clear and moist    Eyes: Pupils are equal, round, and reactive to light  Conjunctivae and EOM are normal    Neck: Normal range of motion  Cardiovascular: Normal rate, regular rhythm and normal heart sounds  Pulmonary/Chest: Effort normal and breath sounds normal    Abdominal: Soft  Bowel sounds are normal    Musculoskeletal: Normal range of motion  Neurological: She is alert and oriented to person, place, and time  She has normal reflexes  Skin: Skin is warm and dry  Psychiatric: She has a normal mood and affect   Her behavior is normal  Judgment and thought content normal    Nursing note and vitals reviewed

## 2019-12-23 ENCOUNTER — SOCIAL WORK (OUTPATIENT)
Dept: BEHAVIORAL/MENTAL HEALTH CLINIC | Facility: CLINIC | Age: 44
End: 2019-12-23
Payer: COMMERCIAL

## 2019-12-23 DIAGNOSIS — F43.23 ADJUSTMENT DISORDER WITH MIXED ANXIETY AND DEPRESSED MOOD: Primary | ICD-10-CM

## 2019-12-23 PROCEDURE — 90834 PSYTX W PT 45 MINUTES: CPT | Performed by: SOCIAL WORKER

## 2019-12-23 NOTE — PATIENT INSTRUCTIONS
Please follow up with exploring Employee Assistant Programs through your employer for outpatient counseling/ therapy services       Please follow up with scheduling an in-take assessment for outpatient therapy at [facility] located @ [address] [place] [address], [place], [facility] Etienne Monroy (D) [phone]
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
## 2020-01-28 ENCOUNTER — APPOINTMENT (EMERGENCY)
Dept: CT IMAGING | Facility: HOSPITAL | Age: 45
End: 2020-01-28
Payer: COMMERCIAL

## 2020-01-28 ENCOUNTER — OFFICE VISIT (OUTPATIENT)
Dept: URGENT CARE | Facility: CLINIC | Age: 45
End: 2020-01-28
Payer: COMMERCIAL

## 2020-01-28 ENCOUNTER — HOSPITAL ENCOUNTER (EMERGENCY)
Facility: HOSPITAL | Age: 45
Discharge: HOME | End: 2020-01-28
Attending: EMERGENCY MEDICINE | Admitting: EMERGENCY MEDICINE
Payer: COMMERCIAL

## 2020-01-28 ENCOUNTER — APPOINTMENT (EMERGENCY)
Dept: RADIOLOGY | Facility: HOSPITAL | Age: 45
End: 2020-01-28
Payer: COMMERCIAL

## 2020-01-28 VITALS
BODY MASS INDEX: 27.68 KG/M2 | DIASTOLIC BLOOD PRESSURE: 81 MMHG | HEIGHT: 60 IN | TEMPERATURE: 97.4 F | HEART RATE: 87 BPM | WEIGHT: 141 LBS | SYSTOLIC BLOOD PRESSURE: 146 MMHG | RESPIRATION RATE: 16 BRPM | OXYGEN SATURATION: 98 %

## 2020-01-28 VITALS
TEMPERATURE: 99.6 F | WEIGHT: 141 LBS | HEIGHT: 60 IN | BODY MASS INDEX: 27.68 KG/M2 | HEART RATE: 76 BPM | DIASTOLIC BLOOD PRESSURE: 84 MMHG | RESPIRATION RATE: 16 BRPM | OXYGEN SATURATION: 99 % | SYSTOLIC BLOOD PRESSURE: 138 MMHG

## 2020-01-28 DIAGNOSIS — R55 SYNCOPE: Primary | ICD-10-CM

## 2020-01-28 DIAGNOSIS — M54.2 NECK PAIN: ICD-10-CM

## 2020-01-28 DIAGNOSIS — R40.20 LOSS OF CONSCIOUSNESS (HCC): Primary | ICD-10-CM

## 2020-01-28 LAB
ALBUMIN SERPL BCP-MCNC: 4.3 G/DL (ref 3.5–5)
ALP SERPL-CCNC: 55 U/L (ref 46–116)
ALT SERPL W P-5'-P-CCNC: 19 U/L (ref 12–78)
ANION GAP SERPL CALCULATED.3IONS-SCNC: 9 MMOL/L (ref 4–13)
AST SERPL W P-5'-P-CCNC: 17 U/L (ref 5–45)
ATRIAL RATE: 68 BPM
BASOPHILS # BLD AUTO: 0.02 THOUSANDS/ΜL (ref 0–0.1)
BASOPHILS NFR BLD AUTO: 1 % (ref 0–1)
BILIRUB SERPL-MCNC: 0.6 MG/DL (ref 0.2–1)
BUN SERPL-MCNC: 10 MG/DL (ref 5–25)
CALCIUM SERPL-MCNC: 9.1 MG/DL (ref 8.3–10.1)
CHLORIDE SERPL-SCNC: 102 MMOL/L (ref 100–108)
CLARITY, POC: CLEAR
CO2 SERPL-SCNC: 30 MMOL/L (ref 21–32)
COLOR, POC: YELLOW
CREAT SERPL-MCNC: 0.94 MG/DL (ref 0.6–1.3)
EOSINOPHIL # BLD AUTO: 0.04 THOUSAND/ΜL (ref 0–0.61)
EOSINOPHIL NFR BLD AUTO: 1 % (ref 0–6)
ERYTHROCYTE [DISTWIDTH] IN BLOOD BY AUTOMATED COUNT: 14.1 % (ref 11.6–15.1)
EXT BILIRUBIN, UA: NORMAL
EXT BLOOD URINE: NORMAL
EXT GLUCOSE, UA: NORMAL
EXT KETONES: 80
EXT NITRITE, UA: NORMAL
EXT PH, UA: 6.5
EXT PREG TEST URINE: NEGATIVE
EXT PROTEIN, UA: NORMAL
EXT SPECIFIC GRAVITY, UA: 1.02
EXT UROBILINOGEN: NORMAL
EXT. CONTROL ED NAV: NORMAL
GFR SERPL CREATININE-BSD FRML MDRD: 74 ML/MIN/1.73SQ M
GLUCOSE SERPL-MCNC: 80 MG/DL (ref 65–140)
HCT VFR BLD AUTO: 35.6 % (ref 34.8–46.1)
HGB BLD-MCNC: 11.5 G/DL (ref 11.5–15.4)
IMM GRANULOCYTES # BLD AUTO: 0 THOUSAND/UL (ref 0–0.2)
IMM GRANULOCYTES NFR BLD AUTO: 0 % (ref 0–2)
LYMPHOCYTES # BLD AUTO: 1.01 THOUSANDS/ΜL (ref 0.6–4.47)
LYMPHOCYTES NFR BLD AUTO: 27 % (ref 14–44)
MCH RBC QN AUTO: 28.1 PG (ref 26.8–34.3)
MCHC RBC AUTO-ENTMCNC: 32.3 G/DL (ref 31.4–37.4)
MCV RBC AUTO: 87 FL (ref 82–98)
MONOCYTES # BLD AUTO: 0.69 THOUSAND/ΜL (ref 0.17–1.22)
MONOCYTES NFR BLD AUTO: 18 % (ref 4–12)
NEUTROPHILS # BLD AUTO: 1.99 THOUSANDS/ΜL (ref 1.85–7.62)
NEUTS SEG NFR BLD AUTO: 53 % (ref 43–75)
NRBC BLD AUTO-RTO: 0 /100 WBCS
P AXIS: 60 DEGREES
PLATELET # BLD AUTO: 172 THOUSANDS/UL (ref 149–390)
PMV BLD AUTO: 11.6 FL (ref 8.9–12.7)
POTASSIUM SERPL-SCNC: 3.6 MMOL/L (ref 3.5–5.3)
PR INTERVAL: 156 MS
PROT SERPL-MCNC: 8.4 G/DL (ref 6.4–8.2)
QRS AXIS: 51 DEGREES
QRSD INTERVAL: 76 MS
QT INTERVAL: 406 MS
QTC INTERVAL: 431 MS
RBC # BLD AUTO: 4.09 MILLION/UL (ref 3.81–5.12)
SODIUM SERPL-SCNC: 141 MMOL/L (ref 136–145)
T WAVE AXIS: 55 DEGREES
TROPONIN I SERPL-MCNC: <0.02 NG/ML
VENTRICULAR RATE: 68 BPM
WBC # BLD AUTO: 3.75 THOUSAND/UL (ref 4.31–10.16)
WBC # BLD EST: NORMAL 10*3/UL

## 2020-01-28 PROCEDURE — 81025 URINE PREGNANCY TEST: CPT | Performed by: EMERGENCY MEDICINE

## 2020-01-28 PROCEDURE — 93010 ELECTROCARDIOGRAM REPORT: CPT | Performed by: INTERNAL MEDICINE

## 2020-01-28 PROCEDURE — 93005 ELECTROCARDIOGRAM TRACING: CPT

## 2020-01-28 PROCEDURE — 85025 COMPLETE CBC W/AUTO DIFF WBC: CPT | Performed by: EMERGENCY MEDICINE

## 2020-01-28 PROCEDURE — 99285 EMERGENCY DEPT VISIT HI MDM: CPT | Performed by: PHYSICIAN ASSISTANT

## 2020-01-28 PROCEDURE — 36415 COLL VENOUS BLD VENIPUNCTURE: CPT | Performed by: EMERGENCY MEDICINE

## 2020-01-28 PROCEDURE — 99213 OFFICE O/P EST LOW 20 MIN: CPT | Performed by: NURSE PRACTITIONER

## 2020-01-28 PROCEDURE — 99285 EMERGENCY DEPT VISIT HI MDM: CPT

## 2020-01-28 PROCEDURE — 84484 ASSAY OF TROPONIN QUANT: CPT | Performed by: EMERGENCY MEDICINE

## 2020-01-28 PROCEDURE — 70498 CT ANGIOGRAPHY NECK: CPT

## 2020-01-28 PROCEDURE — 70496 CT ANGIOGRAPHY HEAD: CPT

## 2020-01-28 PROCEDURE — 80053 COMPREHEN METABOLIC PANEL: CPT | Performed by: EMERGENCY MEDICINE

## 2020-01-28 RX ADMIN — IOHEXOL 100 ML: 350 INJECTION, SOLUTION INTRAVENOUS at 21:05

## 2020-01-28 NOTE — PROGRESS NOTES
Assessment/Plan    [facility] [R40 20]  1  Loss of consciousness ([facility] )  Transfer to other facility         Subjective:     Patient ID: Tj Barton is a 40 y o  female  Reason For Visit / Chief Complaint  Chief Complaint   Patient presents with    Neck Pain     Pt reports that she has a constant pain on the left side of her neck  She states that she noticed a increased pain last night just prior to passin out  She states she did fall and woke on the ground  She is unaware of the time frame of unconsciousness  She states that resulting the fall she has left tarango index finger pain   Loss of Consciousness    Hand Pain         This is a 42-year-old female patient who presents to the urgent care today  Patient reports that last night she was walking up the stairs and she developed severe neck pain  Patient states that she felt everything go dark in her eyes  Next thing she knew, she found herself on the floor  Patient is here for evaluation of loss of consciousness  Patient denies present headache  Patient denies open wound  Patient continues to complain of neck pain  Patient denies chest pain or shortness of breath  No past medical history on file  Past Surgical History:   Procedure Laterality Date    APPENDECTOMY       SECTION         Family History   Problem Relation Age of Onset    Clotting disorder Mother     No Known Problems Father        Review of Systems   Constitutional: Negative for chills and fever  Respiratory: Negative for shortness of breath  Cardiovascular: Negative for chest pain  Musculoskeletal: Positive for neck pain  Neurological:        Pt reported loss of consciousness       Objective:    /84   Pulse 76   Temp 99 6 °F (37 6 °C)   Resp 16   Ht 5' (1 524 m)   Wt 64 kg (141 lb)   SpO2 99%   BMI 27 54 kg/m²     Physical Exam   Constitutional: She is oriented to person, place, and time   She appears well-developed and well-nourished  HENT:   Head: Normocephalic and atraumatic  Neck: Normal range of motion  Cardiovascular: Normal rate  Pulmonary/Chest: Effort normal    Musculoskeletal: Normal range of motion  Neurological: She is alert and oriented to person, place, and time  Skin: Skin is warm and dry  Capillary refill takes less than 2 seconds  Psychiatric: She has a normal mood and affect  Nursing note and vitals reviewed

## 2020-01-28 NOTE — ED PROVIDER NOTES
History  Chief Complaint   Patient presents with    Syncope     Patient reports that she was wasling up the stairs around midnight  had sharp and shooting pain in left neck  States that she had a syncope episode  reports continued pain in left neck  denies SOB, n/v/d, diaphoresis     40year-old previously healthy female presents emergency department for evaluation of unilateral neck pain and syncopal episode  Patient states that around midnight today she was walking up her steps at home when she developed sudden onset nontraumatic left-sided neck pain, she began to feel lightheaded and then does not recall any further events  She states she woke up at a later point on the floor  No one witnessed this incident  She has continued to have neck pain since this occurred, the pain does not radiate and she denies associated headache, vision changes, fevers, chills, sweats, nausea, vomiting, or diarrhea  She has never experienced similar symptoms, and has no history of syncopal events  Currently denies any extremity paresthesias  No history of clotting disorders          Prior to Admission Medications   Prescriptions Last Dose Informant Patient Reported? Taking? NON FORMULARY   Yes Yes   albuterol (PROVENTIL HFA) 90 mcg/act inhaler  Self Yes No   Sig: Inhale 2 puffs   diazepam (VALIUM) 2 mg tablet  Self No No   Sig: Take 1 tablet (2 mg total) by mouth daily   Patient not taking: Reported on 7/17/2019   methylPREDNISolone 4 MG tablet therapy pack  Self No No   Sig: Use as directed on package   Patient not taking: Reported on 10/7/2019   nicotine polacrilex (NICORETTE) 4 mg gum   Yes Yes   Sig: Chew 4 mg as needed for smoking cessation   venlafaxine (EFFEXOR-XR) 75 mg 24 hr capsule   No No   Sig: Take 1 capsule (75 mg total) by mouth daily      Facility-Administered Medications: None       History reviewed  No pertinent past medical history      Past Surgical History:   Procedure Laterality Date    APPENDECTOMY   SECTION      TUBAL LIGATION         Family History   Problem Relation Age of Onset    Clotting disorder Mother     No Known Problems Father      I have reviewed and agree with the history as documented  Social History     Tobacco Use    Smoking status: Never Smoker    Smokeless tobacco: Never Used   Substance Use Topics    Alcohol use: No    Drug use: No        Review of Systems   Constitutional: Negative for chills, diaphoresis and fever  Eyes: Negative for visual disturbance  Respiratory: Negative for cough and shortness of breath  Cardiovascular: Negative for chest pain and palpitations  Gastrointestinal: Negative for abdominal pain, diarrhea, nausea and vomiting  Genitourinary: Negative for dysuria, flank pain and frequency  Musculoskeletal: Positive for neck pain  Negative for arthralgias and myalgias  Skin: Negative for color change, rash and wound  Allergic/Immunologic: Negative for immunocompromised state  Neurological: Positive for syncope  Negative for dizziness, weakness, light-headedness and numbness  Hematological: Does not bruise/bleed easily  Psychiatric/Behavioral: Negative for confusion  The patient is not nervous/anxious  Physical Exam  Physical Exam   Constitutional: She is oriented to person, place, and time  She appears well-developed and well-nourished  No distress  HENT:   Head: Normocephalic and atraumatic  Mouth/Throat: Oropharynx is clear and moist    Eyes: Pupils are equal, round, and reactive to light  No scleral icterus  Neck: No JVD present  Muscular tenderness (L sided) present  No spinous process tenderness present  No neck rigidity  Normal range of motion present  No Brudzinski's sign noted  Cardiovascular: Normal rate and regular rhythm  Exam reveals no gallop and no friction rub  No murmur heard  Pulmonary/Chest: No respiratory distress  She has no wheezes  She has no rales  Abdominal: Soft   Bowel sounds are normal  She exhibits no distension and no mass  There is no tenderness  There is no rebound and no guarding  Musculoskeletal: She exhibits no edema  Neurological: She is alert and oriented to person, place, and time  No cranial nerve deficit  Bilateral upper and lower extremity strength and sensation is intact in all fields and symmetric  Cerebellar functions are normal with no dysmetria or dysdiadochokinesia   Skin: Skin is warm and dry  Capillary refill takes less than 2 seconds  She is not diaphoretic  No pallor  Psychiatric: She has a normal mood and affect  Her behavior is normal    Vitals reviewed        Vital Signs  ED Triage Vitals [01/28/20 1728]   Temperature Pulse Respirations Blood Pressure SpO2   (!) 97 4 °F (36 3 °C) 77 18 163/95 100 %      Temp Source Heart Rate Source Patient Position - Orthostatic VS BP Location FiO2 (%)   Tympanic Monitor Sitting Left arm --      Pain Score       No Pain           Vitals:    01/28/20 1728 01/28/20 1827   BP: 163/95 146/81   Pulse: 77 87   Patient Position - Orthostatic VS: Sitting Sitting         Visual Acuity  Visual Acuity      Most Recent Value   L Pupil Size (mm)  3   R Pupil Size (mm)  3          ED Medications  Medications   iohexol (OMNIPAQUE) 350 MG/ML injection (MULTI-DOSE) 100 mL (100 mL Intravenous Given 1/28/20 2105)       Diagnostic Studies  Results Reviewed     Procedure Component Value Units Date/Time    Comprehensive metabolic panel [[id number]]  (Abnormal) Collected:  01/28/20 1734    Lab Status:  Final result Specimen:  Blood from Arm, Right Updated:  01/28/20 1835     Sodium 141 mmol/L      Potassium 3 6 mmol/L      Chloride 102 mmol/L      CO2 30 mmol/L      ANION GAP 9 mmol/L      BUN 10 mg/dL      Creatinine 0 94 mg/dL      Glucose 80 mg/dL      Calcium 9 1 mg/dL      AST 17 U/L      ALT 19 U/L      Alkaline Phosphatase 55 U/L      Total Protein 8 4 g/dL      Albumin 4 3 g/dL      Total Bilirubin 0 60 mg/dL      eGFR 74 ml/min/1 73sq m Narrative:       National Kidney Disease Foundation guidelines for Chronic Kidney Disease (CKD):     Stage 1 with normal or high GFR (GFR > 90 mL/min/1 73 square meters)    Stage 2 Mild CKD (GFR = 60-89 mL/min/1 73 square meters)    Stage 3A Moderate CKD (GFR = 45-59 mL/min/1 73 square meters)    Stage 3B Moderate CKD (GFR = 30-44 mL/min/1 73 square meters)    Stage 4 Severe CKD (GFR = 15-29 mL/min/1 73 square meters)    Stage 5 End Stage CKD (GFR <15 mL/min/1 73 square meters)  Note: GFR calculation is accurate only with a steady state creatinine    CBC and differential [[id number]]  (Abnormal) Collected:  01/28/20 1827    Lab Status:  Final result Specimen:  Blood from Arm, Right Updated:  01/28/20 1833     WBC 3 75 Thousand/uL      RBC 4 09 Million/uL      Hemoglobin 11 5 g/dL      Hematocrit 35 6 %      MCV 87 fL      MCH 28 1 pg      MCHC 32 3 g/dL      RDW 14 1 %      MPV 11 6 fL      Platelets 790 Thousands/uL      nRBC 0 /100 WBCs      Neutrophils Relative 53 %      Immat GRANS % 0 %      Lymphocytes Relative 27 %      Monocytes Relative 18 %      Eosinophils Relative 1 %      Basophils Relative 1 %      Neutrophils Absolute 1 99 Thousands/µL      Immature Grans Absolute 0 00 Thousand/uL      Lymphocytes Absolute 1 01 Thousands/µL      Monocytes Absolute 0 69 Thousand/µL      Eosinophils Absolute 0 04 Thousand/µL      Basophils Absolute 0 02 Thousands/µL     Troponin I [[id number]]  (Normal) Collected:  01/28/20 1734    Lab Status:  Final result Specimen:  Blood from Arm, Right Updated:  01/28/20 1832     Troponin I <0 02 ng/mL     POCT pregnancy, urine [[id number]]  (Normal) Resulted:  01/28/20 1818    Lab Status:  Final result Updated:  01/28/20 1818     EXT PREG TEST UR (Ref: Negative) negative     Control valid    POCT urinalysis dipstick [[id number]]  (Normal) Resulted:  01/28/20 1817    Lab Status:  Final result Updated:  01/28/20 1818     Color, UA yellow     Clarity, UA clear     Glucose, UA (Ref: Negative) neg     Bilirubin, UA (Ref: Negative) neg     Ketones, UA (Ref: Negative) 80     Spec Grav, UA (Ref:1 003-1 030) 1 020     Blood, UA (Ref: Negative) trace     pH, UA (Ref: 4 5-8 0) 6 5     Protein, UA (Ref: Negative) neh     Urobilinogen, UA (Ref: 0 2- 1 0) neg      Leukocytes, UA (Ref: Negative) neg     Nitrite, UA (Ref: Negative) neg                 CTA head and neck with and without contrast   Final Result by Tony Adan DO (01/28 2128)      Normal CTA of the neck and brain  Workstation performed: [id number]                    Procedures  ECG 12 Lead Documentation Only  Date/Time: 1/28/2020 5:45 PM  Performed by: Sonu Jaimes PA-C  Authorized by: Sonu Jaimes PA-C     Indications / Diagnosis:  Syncope  ECG reviewed by me, the ED Provider: yes    Patient location:  ED  Previous ECG:     Previous ECG:  Unavailable  Interpretation:     Interpretation: normal    Rate:     ECG rate:  68    ECG rate assessment: normal    Rhythm:     Rhythm: sinus rhythm    Ectopy:     Ectopy: none    QRS:     QRS axis:  Normal    QRS intervals:  Normal  Conduction:     Conduction: normal    ST segments:     ST segments:  Normal  T waves:     T waves: normal               ED Course                               MDM  Number of Diagnoses or Management Options  Neck pain: new and requires workup  Syncope: new and requires workup  Diagnosis management comments: 60-year-old presents with sudden onset of neck pain resulting in a syncopal event  She is alert and oriented with a nonfocal, nonlateralizing neurologic exam at the moment  Labs are unremarkable, CTA of the head neck was obtained to rule out possible vertebral artery dissection, fortunately this was unremarkable  This may been a vasovagal event related to pain  No obvious indication for source of neck pain, although she has no fevers to suggest meningitis and no signs of trauma    Recommend home supportive care and primary care follow-up should neck pain worsened  Amount and/or Complexity of Data Reviewed  Clinical lab tests: reviewed and ordered  Tests in the radiology section of CPT®: ordered and reviewed  Tests in the medicine section of CPT®: ordered and reviewed  Review and summarize past medical records: yes  Discuss the patient with other providers: yes  Independent visualization of images, tracings, or specimens: yes          Disposition  Final diagnoses:   Syncope   Neck pain     Time reflects when diagnosis was documented in both MDM as applicable and the Disposition within this note     Time User Action Codes Description Comment    1/28/2020  9:37 PM Ruchi Ledgewood Add [R55] Syncope     1/28/2020  9:37 PM Ruchi Ledgewood Add [M54 2] Neck pain       ED Disposition     ED Disposition Condition Date/Time Comment    Discharge Stable Tu Jan 28, 2020  9:37 PM Nigel Ramsey discharge to home/self care  Follow-up Information     Follow up With Specialties Details Why Contact Info    Laron Kenny DO Family Medicine Schedule an appointment as soon as possible for a visit   [address]  [phone]            Discharge Medication List as of 1/28/2020  9:38 PM      CONTINUE these medications which have NOT CHANGED    Details   nicotine polacrilex (NICORETTE) 4 mg gum Chew 4 mg as needed for smoking cessation, Historical Med      NON FORMULARY Historical Med      albuterol (PROVENTIL HFA) 90 mcg/act inhaler Inhale 2 puffs, Starting Mon 9/12/2016, Historical Med      diazepam (VALIUM) 2 mg tablet Take 1 tablet (2 mg total) by mouth daily, Starting Mon 6/3/2019, Normal      methylPREDNISolone 4 MG tablet therapy pack Use as directed on package, Normal      venlafaxine (EFFEXOR-XR) 75 mg 24 hr capsule Take 1 capsule (75 mg total) by mouth daily, Starting Wed 11/27/2019, Until Mon 5/25/2020, Normal           No discharge procedures on file      ED Provider  Electronically Signed by           Luis Enrique Sosa KIT  01/28/20 2236

## 2020-01-29 NOTE — ED NOTES
Robert discharged by Fawn Villalobos PA-C  Ambulatory to home w/ family        Arnie Joseph RN  01/28/20 0087

## 2020-02-03 ENCOUNTER — VBI (OUTPATIENT)
Dept: FAMILY MEDICINE CLINIC | Facility: CLINIC | Age: 45
End: 2020-02-03

## 2020-02-03 NOTE — TELEPHONE ENCOUNTER
Chetan Amos    ED Visit Information     Ed visit date: 1/28/2020  Diagnosis Description: Syncope; Neck pain  In Network? [facility] [place]  Discharge status: Home  Discharged with meds ? No  Number of ED visits to date: 1  ED Severity:n/a     Outreach Information    Outreach successful: Yes 1  Date letter mailed:n/a  Date Finalized:2/3/2020    Care Coordination    Follow up appointment with pcp: no No ED f/u appt scheduled  Transportation issues ? No    [employer] type:  7 Day Outreach  Emergent necessity warranted by diagnosis:  Yes  [facility] PCP:  Yes  Transportation:  Friend/Family Transport  Called PCP first?:  No  Feels able to call PCP for urgent problems ?:  Yes  Understands what emergencies can be handled by PCP ?:  Yes  Ever any problems getting appointment with PCP for minor emergency/urgency problems?:  Yes  Practice Contacted Patient ?:  No  Pt had ED follow up with pcp/staff ?:  No    Seen for follow-up out of network ?:  No  Reason Patient went to ED instead of Urgent Care or PCP?:  Perceived Severity of Illness  Urgent care Education?:  No  02/03/2020 03:16 PM Phone (Liliya Tao) Lisa Mcpherson (Self) [phone] (M)   Call Complete  Personal communication with patient regarding recent ED visit on 1/28/2020 for Syncope; Neck pain  Patient was discharged without medication and advised to follow up with PCP  Patient stated that she is still having issues since ED visit and will be calling PCP offic e to schedule a follow up appt with PCP  Patient was still at work so she declined to schedule while we were talking  Patient stated that she had seen a neurologist for an arterial issues and believes that he symptoms may be related  Patient does not meet OPCM criteria  Patient is aware of PCP after hours on-call service  Patient is aware of urgent care facility and what conditions may be treated there  Faxed to pharmacy. Natasha Jaquez MA     4/13/2018

## 2020-02-06 ENCOUNTER — OFFICE VISIT (OUTPATIENT)
Dept: FAMILY MEDICINE CLINIC | Facility: CLINIC | Age: 45
End: 2020-02-06
Payer: COMMERCIAL

## 2020-02-06 ENCOUNTER — TELEPHONE (OUTPATIENT)
Dept: NEUROLOGY | Facility: CLINIC | Age: 45
End: 2020-02-06

## 2020-02-06 VITALS
RESPIRATION RATE: 16 BRPM | SYSTOLIC BLOOD PRESSURE: 116 MMHG | DIASTOLIC BLOOD PRESSURE: 74 MMHG | HEIGHT: 60 IN | TEMPERATURE: 98.2 F | OXYGEN SATURATION: 98 % | BODY MASS INDEX: 28.23 KG/M2 | WEIGHT: 143.8 LBS | HEART RATE: 88 BPM

## 2020-02-06 DIAGNOSIS — R55 SYNCOPE, UNSPECIFIED SYNCOPE TYPE: Primary | ICD-10-CM

## 2020-02-06 DIAGNOSIS — F41.9 ANXIETY: ICD-10-CM

## 2020-02-06 DIAGNOSIS — Z12.31 ENCOUNTER FOR SCREENING MAMMOGRAM FOR BREAST CANCER: ICD-10-CM

## 2020-02-06 PROBLEM — R42 VERTIGO: Status: ACTIVE | Noted: 2018-01-05

## 2020-02-06 PROBLEM — G43.109 CLASSICAL MIGRAINE WITHOUT INTRACTABLE MIGRAINE: Status: ACTIVE | Noted: 2018-01-05

## 2020-02-06 PROBLEM — F41.0 PANIC ATTACK: Status: ACTIVE | Noted: 2017-10-05

## 2020-02-06 PROCEDURE — 1036F TOBACCO NON-USER: CPT | Performed by: FAMILY MEDICINE

## 2020-02-06 PROCEDURE — 3008F BODY MASS INDEX DOCD: CPT | Performed by: FAMILY MEDICINE

## 2020-02-06 PROCEDURE — 99214 OFFICE O/P EST MOD 30 MIN: CPT | Performed by: FAMILY MEDICINE

## 2020-02-06 RX ORDER — VENLAFAXINE HYDROCHLORIDE 75 MG/1
75 CAPSULE, EXTENDED RELEASE ORAL DAILY
Qty: 90 CAPSULE | Refills: 1 | Status: SHIPPED | OUTPATIENT
Start: 2020-02-06 | End: 2020-02-06 | Stop reason: SDUPTHER

## 2020-02-06 RX ORDER — VENLAFAXINE HYDROCHLORIDE 75 MG/1
75 CAPSULE, EXTENDED RELEASE ORAL DAILY
Qty: 90 CAPSULE | Refills: 1 | Status: SHIPPED | OUTPATIENT
Start: 2020-02-06 | End: 2020-08-11 | Stop reason: SDUPTHER

## 2020-02-06 NOTE — PATIENT INSTRUCTIONS
Obesity   AMBULATORY CARE:   Obesity  is when your body mass index (BMI) is greater than 30  Your healthcare provider will use your height and weight to measure your BMI  The risks of obesity include  many health problems, such as injuries or physical disability  You may need tests to check for the following:  · Diabetes     · High blood pressure or high cholesterol     · Heart disease     · Gallbladder or liver disease     · Cancer of the colon, breast, prostate, liver, or kidney     · Sleep apnea     · Arthritis or gout  Seek care immediately if:   · You have a severe headache, confusion, or difficulty speaking  · You have weakness on one side of your body  · You have chest pain, sweating, or shortness of breath  Contact your healthcare provider if:   · You have symptoms of gallbladder or liver disease, such as pain in your upper abdomen  · You have knee or hip pain and discomfort while walking  · You have symptoms of diabetes, such as intense hunger and thirst, and frequent urination  · You have symptoms of sleep apnea, such as snoring or daytime sleepiness  · You have questions or concerns about your condition or care  Treatment for obesity  focuses on helping you lose weight to improve your health  Even a small decrease in BMI can reduce the risk for many health problems  Your healthcare provider will help you set a weight-loss goal   · Lifestyle changes  are the first step in treating obesity  These include making healthy food choices and getting regular physical activity  Your healthcare provider may suggest a weight-loss program that involves coaching, education, and therapy  · Medicine  may help you lose weight when it is used with a healthy diet and physical activity  · Surgery  can help you lose weight if you are very obese and have other health problems  There are several types of weight-loss surgery  Ask your healthcare provider for more information    Be successful losing weight:   · Set small, realistic goals  An example of a small goal is to walk for 20 minutes 5 days a week  Anther goal is to lose 5% of your body weight  · Tell friends, family members, and coworkers about your goals  and ask for their support  Ask a friend to lose weight with you, or join a weight-loss support group  · Identify foods or triggers that may cause you to overeat , and find ways to avoid them  Remove tempting high-calorie foods from your home and workplace  Place a bowl of fresh fruit on your kitchen counter  If stress causes you to eat, then find other ways to cope with stress  · Keep a diary to track what you eat and drink  Also write down how many minutes of physical activity you do each day  Weigh yourself once a week and record it in your diary  Eating changes: You will need to eat 500 to 1,000 fewer calories each day than you currently eat to lose 1 to 2 pounds a week  The following changes will help you cut calories:  · Eat smaller portions  Use small plates, no larger than 9 inches in diameter  Fill your plate half full of fruits and vegetables  Measure your food using measuring cups until you know what a serving size looks like  · Eat 3 meals and 1 or 2 snacks each day  Plan your meals in advance  Vika Dougherty and eat at home most of the time  Eat slowly  · Eat fruits and vegetables at every meal   They are low in calories and high in fiber, which makes you feel full  Do not add butter, margarine, or cream sauce to vegetables  Use herbs to season steamed vegetables  · Eat less fat and fewer fried foods  Eat more baked or grilled chicken and fish  These protein sources are lower in calories and fat than red meat  Limit fast food  Dress your salads with olive oil and vinegar instead of bottled dressing  · Limit the amount of sugar you eat  Do not drink sugary beverages  Limit alcohol  Activity changes:  Physical activity is good for your body in many ways   It helps you burn calories and build strong muscles  It decreases stress and depression, and improves your mood  It can also help you sleep better  Talk to your healthcare provider before you begin an exercise program   · Exercise for at least 30 minutes 5 days a week  Start slowly  Set aside time each day for physical activity that you enjoy and that is convenient for you  It is best to do both weight training and an activity that increases your heart rate, such as walking, bicycling, or swimming  · Find ways to be more active  Do yard work and housecleaning  Walk up the stairs instead of using elevators  Spend your leisure time going to events that require walking, such as outdoor festivals or fairs  This extra physical activity can help you lose weight and keep it off  Follow up with your healthcare provider as directed: You may need to meet with a dietitian  Write down your questions so you remember to ask them during your visits  © 2017 [address] [employer] [employer] Information is for End User's use only and may not be sold, redistributed or otherwise used for commercial purposes  All illustrations and images included in CareNotes® are the copyrighted property of [employer]  or Nick Mcdonald  The above information is an  only  It is not intended as medical advice for individual conditions or treatments  Talk to your doctor, nurse or pharmacist before following any medical regimen to see if it is safe and effective for you  Weight Management   AMBULATORY CARE:   Why it is important to manage your weight:  Being overweight increases your risk of health conditions such as heart disease, high blood pressure, type 2 diabetes, and certain types of cancer  It can also increase your risk for osteoarthritis, sleep apnea, and other respiratory problems  Aim for a slow, steady weight loss  Even a small amount of weight loss can lower your risk of health problems    How to lose weight safely:  A safe and healthy way to lose weight is to eat fewer calories and get regular exercise  You can lose up about 1 pound a week by decreasing the number of calories you eat by 500 calories each day  You can decrease calories by eating smaller portion sizes or by cutting out high-calorie foods  Read labels to find out how many calories are in the foods you eat  You can also burn calories with exercise such as walking, swimming, or biking  You will be more likely to keep weight off if you make these changes part of your lifestyle  Healthy meal plan for weight management:  A healthy meal plan includes a variety of foods, contains fewer calories, and helps you stay healthy  A healthy meal plan includes the following:  · Eat whole-grain foods more often  A healthy meal plan should contain fiber  Fiber is the part of grains, fruits, and vegetables that is not broken down by your body  Whole-grain foods are healthy and provide extra fiber in your diet  Some examples of whole-grain foods are whole-wheat breads and pastas, oatmeal, brown rice, and bulgur  · Eat a variety of vegetables every day  Include dark, leafy greens such as spinach, kale, esperanza greens, and mustard greens  Eat yellow and orange vegetables such as carrots, sweet potatoes, and winter squash  · Eat a variety of fruits every day  Choose fresh or canned fruit (canned in its own juice or light syrup) instead of juice  Fruit juice has very little or no fiber  · Eat low-fat dairy foods  Drink fat-free (skim) milk or 1% milk  Eat fat-free yogurt and low-fat cottage cheese  Try low-fat cheeses such as mozzarella and other reduced-fat cheeses  · Choose meat and other protein foods that are low in fat  Choose beans or other legumes such as split peas or lentils  Choose fish, skinless poultry (chicken or turkey), or lean cuts of red meat (beef or pork)  Before you cook meat or poultry, cut off any visible fat  · Use less fat and oil  Try baking foods instead of frying them  Add less fat, such as margarine, sour cream, regular salad dressing and mayonnaise to foods  Eat fewer high-fat foods  Some examples of high-fat foods include french fries, doughnuts, ice cream, and cakes  · Eat fewer sweets  Limit foods and drinks that are high in sugar  This includes candy, cookies, regular soda, and sweetened drinks  Ways to decrease calories:   · Eat smaller portions  ¨ Use a small plate with smaller servings  ¨ Do not eat second helpings  ¨ When you eat at a restaurant, ask for a box and place half of your meal in the box before you eat  ¨ Share an entrée with someone else  · Replace high-calorie snacks with healthy, low-calorie snacks  ¨ Choose fresh fruit, vegetables, fat-free rice cakes, or air-popped popcorn instead of potato chips, nuts, or chocolate  ¨ Choose water or calorie-free drinks instead of soda or sweetened drinks  · Eat regular meals  Skipping meals can lead to overeating later in the day  Eat a healthy snack in place of a meal if you do not have time to eat a regular meal      · Do not shop for groceries when you are hungry  You may be more likely to make unhealthy food choices  Take a grocery list of healthy foods and shop after you have eaten  Exercise:  Exercise at least 30 minutes per day on most days of the week  Some examples of exercise include walking, biking, dancing, and swimming  You can also fit in more physical activity by taking the stairs instead of the elevator or parking farther away from stores  Ask your healthcare provider about the best exercise plan for you  Other things to consider as you try to lose weight:   · Be aware of situations that may give you the urge to overeat, such as eating while watching television  Find ways to avoid these situations  For example, read a book, go for a walk, or do crafts      · Meet with a weight loss support group or friends who are also trying to lose weight  This may help you stay motivated to continue working on your weight loss goals  © 2017 [employer] Information is for End User's use only and may not be sold, redistributed or otherwise used for commercial purposes  All illustrations and images included in CareNotes® are the copyrighted property of [employer]  or Nick Mcdonald  The above information is an  only  It is not intended as medical advice for individual conditions or treatments  Talk to your doctor, nurse or pharmacist before following any medical regimen to see if it is safe and effective for you

## 2020-02-06 NOTE — PROGRESS NOTES
Assessment/Plan:     Diagnoses and all orders for this visit:    Syncope, unspecified syncope type  -     Echo complete with contrast if indicated; Future  -     Holter monitor - 24 hour; Future  -     CBC; Future  -     Comprehensive metabolic panel; Future  -     TSH, 3rd generation; Future  -     UA (URINE) with reflex to Scope; Future  -     Lipid panel; Future  -     Sedimentation rate, automated; Future  -     Lyme Antibody Profile with reflex to WB; Future    Anxiety  -     Discontinue: venlafaxine (EFFEXOR-XR) 75 mg 24 hr capsule; Take 1 capsule (75 mg total) by mouth daily  -     venlafaxine (EFFEXOR-XR) 75 mg 24 hr capsule; Take 1 capsule (75 mg total) by mouth daily    BMI 28 0-28 9,adult    Encounter for screening mammogram for breast cancer  -     Mammo screening bilateral w 3d & cad; Future      reviewed patient's ER records  Patient has been having multiple episodes of syncope  The cause is been on no  She has had multiple workups on her head  But her results were normal  She has had increased amount of stress lately and this could be more of an anxiety /stress reaction will order Holter monitor and echo as well as blood work to rule out any other medical conditions  And if this workup is negative will focus more on the psychiatric component  She is to follow up with me after the testing is done within 1 to  4 weeks      I have spent 40 minutes with Patient and family today in which greater than 50% of this time was spent in counseling/coordination of care regarding Diagnostic results, Risks and benefits of tx options, Intructions for management and Impressions  Subjective:     Chief Complaint   Patient presents with    Follow-up     from [facility] ER for blacking out         Patient ID: Cynthia Gregg is a 40 y o  female      Patient is here for follow-up emergency room visit  She was walking upstairs and suddenly passed out  She has had 2 other episodes of syncope within the past week or 2  Last set episode led her to the emergency room  She has no other injuries however the workup was normal  She has been under increased amount of stress lately  Recently he multiple family members have been sick with viral illnesses      The following portions of the patient's history were reviewed and updated as appropriate: allergies, current medications, past family history, past medical history, past social history, past surgical history and problem list     Review of Systems   Constitutional: Negative  HENT: Negative  Eyes: Negative  Respiratory: Negative  Cardiovascular: Negative  Gastrointestinal: Negative  Endocrine: Negative  Genitourinary: Negative  Musculoskeletal: Negative  Skin: Negative  Allergic/Immunologic: Negative  Neurological: Positive for syncope and headaches  Hematological: Negative  Psychiatric/Behavioral: The patient is nervous/anxious  All other systems reviewed and are negative  Objective:    Vitals:    02/06/20 1532   BP: 116/74   BP Location: Right arm   Patient Position: Sitting   Cuff Size: Standard   Pulse: 88   Resp: 16   Temp: 98 2 °F (36 8 °C)   TempSrc: Tympanic   SpO2: 98%   Weight: 65 2 kg (143 lb 12 8 oz)   Height: 5' (1 524 m)          Physical Exam   Constitutional: She is oriented to person, place, and time  She appears well-developed and well-nourished  HENT:   Head: Normocephalic and atraumatic  Right Ear: External ear normal    Left Ear: External ear normal    Mouth/Throat: Oropharynx is clear and moist    Eyes: Pupils are equal, round, and reactive to light  Conjunctivae and EOM are normal    Neck: Normal range of motion  Cardiovascular: Normal rate, regular rhythm and normal heart sounds  Pulmonary/Chest: Effort normal and breath sounds normal    Abdominal: Soft  Bowel sounds are normal    Musculoskeletal: Normal range of motion  Neurological: She is alert and oriented to person, place, and time  She has normal reflexes  Skin: Skin is warm and dry  Psychiatric: She has a normal mood and affect  Her behavior is normal  Judgment and thought content normal    Nursing note and vitals reviewed  BMI Counseling: Body mass index is 28 08 kg/m²  The BMI is above normal  Nutrition recommendations include reducing portion sizes, decreasing overall calorie intake, 3-5 servings of fruits/vegetables daily, reducing fast food intake, consuming healthier snacks, decreasing soda and/or juice intake, moderation in carbohydrate intake, increasing intake of lean protein, reducing intake of saturated fat and trans fat and reducing intake of cholesterol  Exercise recommendations include exercising 3-5 times per week

## 2020-02-06 NOTE — TELEPHONE ENCOUNTER
CTA was normal but needs further work up  Should see cardiology for the syncope as well  No intercourse or orgasms until seen    Khai Ly- please assist patient in seeing Dr Shanita Ceballos within 2 weeks    Thanks

## 2020-02-06 NOTE — TELEPHONE ENCOUNTER
pt called and states that she had alot of pain to her l side of her neck and ended up passing out last week  she states that she was very stressed prior to this occuring  she had the pain and she was trying to rub it and she felt a lump on her throat and then she passed out  she was going up the steps when the pain started  she was seen in  ER   states that CT was done  then this past weekend she has the shooting pain again and felt very lightheaded but she was laying in bed  she did not pass out  she states that she was very excited when this occured  she had an orgasm and then pain occured right after  pain is severe and pain stays for 2 days and then subsides-pain is contant 4/10 and then resolves  no migraines  the ER advised that if she has any more episode to see her pcp  she is seeing pcp today  last seen in our office oct 2018    she spoke to scheduling already and next available is in may  please advise  [phone] to leave a detailed message

## 2020-02-07 NOTE — TELEPHONE ENCOUNTER
Pt made aware  She saw pcp yesterday and they ordered echo, and holter  These are scheduled for 4/15  Please advise if you want to enter referral for cardiology or wait until testing is complete   pcp was planning on waiting for testing to be completed first       Tammy-please reach out to pt regarding appt

## 2020-02-07 NOTE — TELEPHONE ENCOUNTER
Called patient and left a message to call back to schedule an appointment   I have 02/21/20 at 03:30pm on hold

## 2020-02-11 LAB
ALBUMIN SERPL-MCNC: 4.2 G/DL (ref 3.8–4.8)
ALBUMIN/GLOB SERPL: 1.7 {RATIO} (ref 1.2–2.2)
ALP SERPL-CCNC: 43 IU/L (ref 39–117)
ALT SERPL-CCNC: 12 IU/L (ref 0–32)
APPEARANCE UR: CLEAR
AST SERPL-CCNC: 17 IU/L (ref 0–40)
B BURGDOR IGG+IGM SER-ACNC: <0.91 ISR (ref 0–0.9)
B BURGDOR IGM SER IA-ACNC: <0.8 INDEX (ref 0–0.79)
BACTERIA URNS QL MICRO: NORMAL
BASOPHILS # BLD AUTO: 0 X10E3/UL (ref 0–0.2)
BASOPHILS NFR BLD AUTO: 0 %
BILIRUB SERPL-MCNC: 0.5 MG/DL (ref 0–1.2)
BILIRUB UR QL STRIP: NEGATIVE
BUN SERPL-MCNC: 12 MG/DL (ref 6–24)
BUN/CREAT SERPL: 14 (ref 9–23)
CALCIUM SERPL-MCNC: 9.3 MG/DL (ref 8.7–10.2)
CHLORIDE SERPL-SCNC: 100 MMOL/L (ref 96–106)
CHOLEST SERPL-MCNC: 163 MG/DL (ref 100–199)
CO2 SERPL-SCNC: 23 MMOL/L (ref 20–29)
COLOR UR: YELLOW
CREAT SERPL-MCNC: 0.85 MG/DL (ref 0.57–1)
EOSINOPHIL # BLD AUTO: 0.1 X10E3/UL (ref 0–0.4)
EOSINOPHIL NFR BLD AUTO: 2 %
EPI CELLS #/AREA URNS HPF: NORMAL /HPF (ref 0–10)
ERYTHROCYTE [DISTWIDTH] IN BLOOD BY AUTOMATED COUNT: 15.1 % (ref 11.7–15.4)
GLOBULIN SER-MCNC: 2.5 G/DL (ref 1.5–4.5)
GLUCOSE SERPL-MCNC: 83 MG/DL (ref 65–99)
GLUCOSE UR QL: NEGATIVE
HCT VFR BLD AUTO: 36.5 % (ref 34–46.6)
HDLC SERPL-MCNC: 55 MG/DL
HGB BLD-MCNC: 12.1 G/DL (ref 11.1–15.9)
HGB UR QL STRIP: NEGATIVE
IMM GRANULOCYTES # BLD: 0 X10E3/UL (ref 0–0.1)
IMM GRANULOCYTES NFR BLD: 0 %
KETONES UR QL STRIP: NEGATIVE
LDLC SERPL CALC-MCNC: 91 MG/DL (ref 0–99)
LEUKOCYTE ESTERASE UR QL STRIP: NEGATIVE
LYMPHOCYTES # BLD AUTO: 1.8 X10E3/UL (ref 0.7–3.1)
LYMPHOCYTES NFR BLD AUTO: 28 %
MCH RBC QN AUTO: 27.8 PG (ref 26.6–33)
MCHC RBC AUTO-ENTMCNC: 33.2 G/DL (ref 31.5–35.7)
MCV RBC AUTO: 84 FL (ref 79–97)
MICRO URNS: ABNORMAL
MICRO URNS: ABNORMAL
MONOCYTES # BLD AUTO: 0.7 X10E3/UL (ref 0.1–0.9)
MONOCYTES NFR BLD AUTO: 11 %
NEUTROPHILS # BLD AUTO: 3.6 X10E3/UL (ref 1.4–7)
NEUTROPHILS NFR BLD AUTO: 59 %
NITRITE UR QL STRIP: NEGATIVE
PH UR STRIP: 8 [PH] (ref 5–7.5)
PLATELET # BLD AUTO: 232 X10E3/UL (ref 150–450)
POTASSIUM SERPL-SCNC: 4.3 MMOL/L (ref 3.5–5.2)
PROT SERPL-MCNC: 6.7 G/DL (ref 6–8.5)
PROT UR QL STRIP: NEGATIVE
RBC # BLD AUTO: 4.35 X10E6/UL (ref 3.77–5.28)
RBC #/AREA URNS HPF: NORMAL /HPF (ref 0–2)
SL AMB EGFR AFRICAN AMERICAN: 96 ML/MIN/1.73
SL AMB EGFR NON AFRICAN AMERICAN: 84 ML/MIN/1.73
SL AMB URINALYSIS REFLEX: ABNORMAL
SL AMB VLDL CHOLESTEROL CALC: 17 MG/DL (ref 5–40)
SODIUM SERPL-SCNC: 135 MMOL/L (ref 134–144)
SP GR UR: 1.01 (ref 1–1.03)
TRIGL SERPL-MCNC: 83 MG/DL (ref 0–149)
TSH SERPL DL<=0.005 MIU/L-ACNC: 2.94 UIU/ML (ref 0.45–4.5)
UROBILINOGEN UR STRIP-ACNC: 0.2 MG/DL (ref 0.2–1)
WBC # BLD AUTO: 6.3 X10E3/UL (ref 3.4–10.8)
WBC #/AREA URNS HPF: NORMAL /HPF (ref 0–5)

## 2020-02-21 ENCOUNTER — OFFICE VISIT (OUTPATIENT)
Dept: NEUROLOGY | Facility: CLINIC | Age: 45
End: 2020-02-21
Payer: COMMERCIAL

## 2020-02-21 VITALS
DIASTOLIC BLOOD PRESSURE: 64 MMHG | WEIGHT: 147.6 LBS | HEIGHT: 60 IN | HEART RATE: 92 BPM | SYSTOLIC BLOOD PRESSURE: 129 MMHG | BODY MASS INDEX: 28.98 KG/M2

## 2020-02-21 DIAGNOSIS — G43.109 VERTIGINOUS MIGRAINE: ICD-10-CM

## 2020-02-21 DIAGNOSIS — M54.2 NECK PAIN: Primary | ICD-10-CM

## 2020-02-21 PROCEDURE — 99214 OFFICE O/P EST MOD 30 MIN: CPT | Performed by: PSYCHIATRY & NEUROLOGY

## 2020-02-21 PROCEDURE — 3008F BODY MASS INDEX DOCD: CPT | Performed by: PSYCHIATRY & NEUROLOGY

## 2020-02-21 PROCEDURE — 1036F TOBACCO NON-USER: CPT | Performed by: PSYCHIATRY & NEUROLOGY

## 2020-02-21 NOTE — PROGRESS NOTES
Review of Systems   Constitutional: Negative  HENT: Negative  Eyes: Positive for visual disturbance (Blurred Vision )  Respiratory: Negative  Cardiovascular: Negative  Gastrointestinal: Negative  Endocrine: Negative  Genitourinary: Negative  Musculoskeletal: Positive for neck pain  Skin: Negative  Allergic/Immunologic: Negative  Neurological: Positive for dizziness, syncope and headaches  Psychiatric/Behavioral: Positive for decreased concentration

## 2020-02-21 NOTE — PROGRESS NOTES
[facility]  PATIENT:  Ion Randall  MRN:  [id number]  :  1975  DATE OF SERVICE:  2020      Assessment/Plan:          Problem List Items Addressed This Visit        Other    Vertiginous migraine    Relevant Orders    MRI brain with and without contrast    Neck pain - Primary    Relevant Orders    Ambulatory referral to Physical Therapy         Syncopal event type 2 now:   will get MRI of head  Holter monitor pending  CTA head and neck wnl  Discussed with patient in length that she is not to drive  I will not take her 's license away at this time  She does have workup pending and her  will be driving her to work for now  Will see patient back in 2 months to discuss further treatment for syncopal events  Neck pain:   - Will refer to PT for evaluation and treatment  - after she has seen PT she is to call if no improvement for MR or steroids    - Neck pathology and poor posture, with straightening of the normal cervical lordosis, can cause headaches  Tightening of the neck muscles can irritate the nerves in the occipital region of the head and cause or worsen head pain  Thus neck strengthening and relaxation exercises, can help improve this particular pain  It is importance to have good posture for improving shoulder, neck, and head pain  - Here are some exercises which should take 5 minutes:     1  Standing, drop your head to one side while continuing to look ahead  Hold for 10 seconds then swap sides  Repeat twice more each side  To increase the stretch, drop the opposite shoulder  2  Standing again, lower your chin to your chest, hold for 10 and then look up to the ceiling and hold for 10  Repeat twice more  3  Next, standing straight again, look over your right shoulder and hold firm for 10 seconds, then over your left shoulder for 10  Repeat this 3 times       4  Finally, while sitting upright, bring your head forward and hold for 10, then all the way back and hold for 10  If this simple exercise does not help improve the posture, we will consider formal physical therapy in the future  Please call with any questions or concerns  Office number is [phone] Monitoring Program report was reviewed and was appropriate       History of Present Illness: We had the pleasure of evaluating Tennille Conway in neurological Follow-up today   As you know,  she is a 43 y  o  right handed female  She is   Lizy Fitting is here today for evaluation of her headaches  Medical history review:  - QTc - 1/29/2020 - 431  History Tobacco use:never    Depression and anxiety: she is currently on venlafaxine 75mg and last increased in Dec of 2019         Migraine headaches without aura/Tension type headaches:   What medications do you take or have you taken for your headaches? Preventive therapy used:  - mag, b2,  - venlafaxine,   - propranolol, verapamil,   Abortive therapy used:  - Fioricet,  Excedrin  - aleve,  -  tylenol,        What is your current pain level? 3/10    How often do the headaches occur -   Feb: 8 out of 13 days - trigger weather and menstruation  March: 6 headaches - she had dizziness with this and states she had nausea and felt as if her worlds was spinning  She did have 5 days of dizziness with no headaches she states she has the feeling as if the world was moving and at time has problem with her balance    April: 13 headaches and  ?   She thinks that during this month that was secondary to weather and allergies  Summer was good  No migraines  However since school started began having migraines again and had leaking air conditioner  Discovered black mold and thinks it has been treated  Had been taking 2 aleve since school started and now is fine  Works 90% of the time  her headaches that get up to 9/10 comes on suddenly and gets worse quickly        Mild: 3/7 days  Moderate to severe: 1 in 3 months (Her Migraine)    Are you ever headache free? Yes    Any warning prior to headache and how long do they last? None    What time of the day do the headaches start - anytime    How long do the headaches last - typically lasts for few days  February 13, 2020 - less then 24 hours  Where are they located - right frontal    What is the intensity of pain - 1 to 10/10, over the last one year she had one that was severe and lasted for days  Describe your usual headache -  Pressure and throbbing at time    Associated symptoms:   · Decrease of appetite, nausea  · Photophobia, phonophobia  · Problem with concentration  · light-headed or dizzy  · prefer to be alone and in a dark room, unable to work     Number of days missed per month because of headaches:  Work (or school) days: none  Social or Family activities: Sometimes    Alternative therapies used in the past for headaches? chiropractic care, dietary change and physical therapy  Headache are worse if the patient: bending over  Headache triggers:  No    What time of the year do headaches occur more frequently? do not seem to be related to any time of day or year  Have you seen someone else for headaches or pain? No  Have you had trigger point injection performed and how often? No  Have you had Botox injection performed and how often? No   Have you had epidural injections or transforaminal injections performed? No    Have you used CBD or THC for your headaches and how often? No    Are you current pregnant or planning on getting pregnant? No    Have you ever had any Brain imaging? no I personally reviewed these images  Recent laboratory data was reviewed  Medications and allergies were reviewed  - Reviewed old notes from physician seen in the past- see above HPI for summary of previous encounters       Past Medical History:   Diagnosis Date    Migraine        Patient Active Problem List   Diagnosis    Migraine without aura and without status migrainosus, not intractable    Tension headache    Vertiginous migraine    Vitamin D deficiency    Adjustment disorder with mixed anxiety and depressed mood    Classical migraine without intractable migraine    Depressive disorder    GERD (gastroesophageal reflux disease)    Neck pain    Panic attack    Vertigo       Medications:      Current Outpatient Medications   Medication Sig Dispense Refill    venlafaxine (EFFEXOR-XR) 75 mg 24 hr capsule Take 1 capsule (75 mg total) by mouth daily 90 capsule 1    albuterol (Proventil HFA) 90 mcg/act inhaler Inhale 2 puffs        No current facility-administered medications for this visit           Allergies:    No Known Allergies    Family History:     Family History   Problem Relation Age of Onset    Clotting disorder Mother     No Known Problems Father        Social History:     Social History     Socioeconomic History    Marital status: /Civil Union     Spouse name: Not on file    Number of children: Not on file    Years of education: Not on file    Highest education level: Not on file   Occupational History    Not on file   Social Needs    Financial resource strain: Not on file    Food insecurity:     Worry: Not on file     Inability: Not on file    Transportation needs:     Medical: Not on file     Non-medical: Not on file   Tobacco Use    Smoking status: Never Smoker    Smokeless tobacco: Never Used   Substance and Sexual Activity    Alcohol use: No    Drug use: No    Sexual activity: Not Currently   Lifestyle    Physical activity:     Days per week: Not on file     Minutes per session: Not on file    Stress: Not on file   Relationships    Social connections:     Talks on phone: Not on file     Gets together: Not on file     Attends [employer] service: Not on file     Active member of club or organization: Not on file     Attends meetings of clubs or organizations: Not on file     Relationship status: Not on file    Intimate partner violence:     Fear of current or ex partner: Not on file     Emotionally abused: Not on file     Physically abused: Not on file     Forced sexual activity: Not on file   Other Topics Concern    Not on file   Social History Narrative    Not on file         Objective:   Physical Exam:                                                                   Vitals:            /64 (BP Location: Right arm, Patient Position: Sitting, Cuff Size: Standard)   Pulse 92   Ht 5' (1 524 m)   Wt 67 kg (147 lb 9 6 oz)   BMI 28 83 kg/m²   BP Readings from Last 3 Encounters:   02/21/20 129/64   02/06/20 116/74   01/28/20 146/81     Pulse Readings from Last 3 Encounters:   02/21/20 92   02/06/20 88   01/28/20 87            CONSTITUTIONAL: Well developed, well nourished, well groomed  No dysmorphic features  Eyes:  PERRLA, EOM normal      Neck:  Normal ROM, neck supple  HEENT:  Normocephalic atraumatic  No meningismus  Oropharynx is clear and moist  No oral mucosal lesions  Chest:  Respirations regular and unlabored  Cardiovascular:  Distal extremities warm without palpable edema or tenderness, no observed significant swelling  Musculoskeletal:  Full range of motion  Skin:  warm and dry   Psychiatric:  Normal behavior and appropriate affect        Neurological Examination:   Mental status/cognitive function: Orientated to time, place and person       Cranial Nerves: 2 to 12 intact    Motor Exam:    5/5 in the right upper extremity  5/5 in the left upper extremity  5/5 in the right lower extremity  5/5 in the left lower extremity   No spasticity noted    Sensory:   Intact to light touch /pinprick in the upper extremity  Intact to light touch /pinprick in the lower extremity    Reflexes:   3/4  right upper extremity  3/4  left upper extremity  3/4 left lower extremity  3/4  right lower extremity  No clonus noted    Coordination: Finger to nose intact bilaterally, no tremor noted    Gait:   - Steady casual gait  - Able to do tandem gait without difficulty  - Negative Romberg               Review of Systems:   Review of Systems  Review of Systems   Constitutional: Negative  HENT: Negative  Eyes: Positive for visual disturbance (Blurred Vision )  Respiratory: Negative  Cardiovascular: Negative  Gastrointestinal: Negative  Endocrine: Negative  Genitourinary: Negative  Musculoskeletal: Positive for neck pain  Skin: Negative  Allergic/Immunologic: Negative  Neurological: Positive for dizziness, syncope and headaches  Psychiatric/Behavioral: Positive for decreased concentration  I have spent 30 minutes with Patient  today in which greater than 50% of this time was spent in counseling/coordination of care regarding Diagnostic results, Prognosis, Risks and benefits of tx options, Intructions for management, Patient and family education, Importance of tx compliance, Risk factor reductions, Impressions and Plan of care as above           Author:  Deana King MD 2/21/2020 5:01 PM

## 2020-02-21 NOTE — PATIENT INSTRUCTIONS
Syncopal event type 2 now:   will get MRI of head  Holter monitor pending  CTA head and neck wnl       Neck pain:   - Will refer to PT for evaluation and treatment  - after she has seen PT she is to call if no improvement for MR or steriods  - Neck pathology and poor posture, with straightening of the normal cervical lordosis, can cause headaches  Tightening of the neck muscles can irritate the nerves in the occipital region of the head and cause or worsen head pain  Thus neck strengthening and relaxation exercises, can help improve this particular pain  It is importance to have good posture for improving shoulder, neck, and head pain  - Here are some exercises which should take 5 minutes:     1  Standing, drop your head to one side while continuing to look ahead  Hold for 10 seconds then swap sides  Repeat twice more each side  To increase the stretch, drop the opposite shoulder  2  Standing again, lower your chin to your chest, hold for 10 and then look up to the ceiling and hold for 10  Repeat twice more  3  Next, standing straight again, look over your right shoulder and hold firm for 10 seconds, then over your left shoulder for 10  Repeat this 3 times  4  Finally, while sitting upright, bring your head forward and hold for 10, then all the way back and hold for 10  If this simple exercise does not help improve the posture, we will consider formal physical therapy in the future  Please call with any questions or concerns   Office number is [phone]

## 2020-03-02 ENCOUNTER — TELEPHONE (OUTPATIENT)
Dept: NEUROLOGY | Facility: CLINIC | Age: 45
End: 2020-03-02

## 2020-03-02 NOTE — TELEPHONE ENCOUNTER
We can bring this patient in with a physician assistant and they can discuss this case with me    Thank you

## 2020-03-02 NOTE — TELEPHONE ENCOUNTER
Pt called to state that she will be having echo and holter monitor on 3/6/2020  She was last seen on 2/21/2020 by Dr Natali Donohue  The plan was to discuss cardiac testing at next office visit  Next visit scheduled for 5/18/2020 with Dr Natali Donohue  This is the soonest available appt  Pt is asking if possible to be seen sooner than this  Would you prefer to see patient personally or could she be seen by AP? Linda has some late April appts/early May

## 2020-03-03 NOTE — TELEPHONE ENCOUNTER
Left a detailed message to call office back if she would be agreeable to reschedule appointment with a PA

## 2020-03-06 ENCOUNTER — HOSPITAL ENCOUNTER (OUTPATIENT)
Dept: NON INVASIVE DIAGNOSTICS | Age: 45
Discharge: HOME | End: 2020-03-06
Payer: COMMERCIAL

## 2020-03-06 DIAGNOSIS — R55 SYNCOPE, UNSPECIFIED SYNCOPE TYPE: ICD-10-CM

## 2020-03-06 PROCEDURE — 93306 TTE W/DOPPLER COMPLETE: CPT

## 2020-03-06 PROCEDURE — 93226 XTRNL ECG REC<48 HR SCAN A/R: CPT

## 2020-03-06 PROCEDURE — 93306 TTE W/DOPPLER COMPLETE: CPT | Performed by: INTERNAL MEDICINE

## 2020-03-06 PROCEDURE — 93225 XTRNL ECG REC<48 HRS REC: CPT

## 2020-03-10 ENCOUNTER — HOSPITAL ENCOUNTER (OUTPATIENT)
Dept: MRI IMAGING | Facility: HOSPITAL | Age: 45
Discharge: HOME | End: 2020-03-10
Attending: PSYCHIATRY & NEUROLOGY
Payer: COMMERCIAL

## 2020-03-10 DIAGNOSIS — G43.109 VERTIGINOUS MIGRAINE: ICD-10-CM

## 2020-03-10 PROCEDURE — A9585 GADOBUTROL INJECTION: HCPCS | Performed by: PSYCHIATRY & NEUROLOGY

## 2020-03-10 PROCEDURE — 70553 MRI BRAIN STEM W/O & W/DYE: CPT

## 2020-03-10 RX ADMIN — GADOBUTROL 6 ML: 604.72 INJECTION INTRAVENOUS at 17:20

## 2020-03-18 PROCEDURE — 93227 XTRNL ECG REC<48 HR R&I: CPT | Performed by: INTERNAL MEDICINE

## 2020-05-15 ENCOUNTER — TELEPHONE (OUTPATIENT)
Dept: NEUROLOGY | Facility: CLINIC | Age: 45
End: 2020-05-15

## 2020-05-18 ENCOUNTER — TELEMEDICINE (OUTPATIENT)
Dept: NEUROLOGY | Facility: CLINIC | Age: 45
End: 2020-05-18
Payer: COMMERCIAL

## 2020-05-18 VITALS
WEIGHT: 160 LBS | DIASTOLIC BLOOD PRESSURE: 81 MMHG | SYSTOLIC BLOOD PRESSURE: 126 MMHG | HEIGHT: 60 IN | BODY MASS INDEX: 31.41 KG/M2 | HEART RATE: 86 BPM

## 2020-05-18 DIAGNOSIS — G43.009 MIGRAINE WITHOUT AURA AND WITHOUT STATUS MIGRAINOSUS, NOT INTRACTABLE: Primary | ICD-10-CM

## 2020-05-18 DIAGNOSIS — F32.A DEPRESSIVE DISORDER: ICD-10-CM

## 2020-05-18 DIAGNOSIS — R55 VASOVAGAL SYNCOPE: ICD-10-CM

## 2020-05-18 DIAGNOSIS — G43.109 VERTIGINOUS MIGRAINE: ICD-10-CM

## 2020-05-18 DIAGNOSIS — E55.9 VITAMIN D DEFICIENCY: ICD-10-CM

## 2020-05-18 PROBLEM — R42 VERTIGO: Status: RESOLVED | Noted: 2018-01-05 | Resolved: 2020-05-18

## 2020-05-18 PROCEDURE — 99214 OFFICE O/P EST MOD 30 MIN: CPT | Performed by: PSYCHIATRY & NEUROLOGY

## 2020-05-18 RX ORDER — CETIRIZINE HYDROCHLORIDE 10 MG/1
10 TABLET ORAL DAILY
COMMUNITY
End: 2021-05-12 | Stop reason: ALTCHOICE

## 2020-05-18 RX ORDER — COVID-19 ANTIGEN TEST
220 KIT MISCELLANEOUS AS NEEDED
COMMUNITY
End: 2021-05-12 | Stop reason: SDUPTHER

## 2020-05-18 RX ORDER — VENLAFAXINE HYDROCHLORIDE 37.5 MG/1
CAPSULE, EXTENDED RELEASE ORAL
Qty: 30 CAPSULE | Refills: 3 | Status: SHIPPED | OUTPATIENT
Start: 2020-05-18 | End: 2020-08-11 | Stop reason: SDUPTHER

## 2020-07-16 DIAGNOSIS — M54.2 NECK PAIN: Primary | ICD-10-CM

## 2020-07-16 RX ORDER — METHYLPREDNISOLONE 4 MG/1
TABLET ORAL
Qty: 21 EACH | Refills: 0 | Status: SHIPPED | OUTPATIENT
Start: 2020-07-16 | End: 2020-12-11

## 2020-08-04 DIAGNOSIS — F41.9 ANXIETY: ICD-10-CM

## 2020-08-04 RX ORDER — VENLAFAXINE HYDROCHLORIDE 75 MG/1
CAPSULE, EXTENDED RELEASE ORAL
Qty: 90 CAPSULE | Refills: 3 | OUTPATIENT
Start: 2020-08-04

## 2020-08-11 ENCOUNTER — OFFICE VISIT (OUTPATIENT)
Dept: NEUROLOGY | Facility: CLINIC | Age: 45
End: 2020-08-11
Payer: COMMERCIAL

## 2020-08-11 VITALS
DIASTOLIC BLOOD PRESSURE: 77 MMHG | TEMPERATURE: 98.3 F | HEIGHT: 60 IN | HEART RATE: 73 BPM | BODY MASS INDEX: 29.31 KG/M2 | WEIGHT: 149.3 LBS | SYSTOLIC BLOOD PRESSURE: 134 MMHG

## 2020-08-11 DIAGNOSIS — G43.009 MIGRAINE WITHOUT AURA AND WITHOUT STATUS MIGRAINOSUS, NOT INTRACTABLE: ICD-10-CM

## 2020-08-11 DIAGNOSIS — F32.A DEPRESSIVE DISORDER: Primary | ICD-10-CM

## 2020-08-11 DIAGNOSIS — F41.9 ANXIETY: ICD-10-CM

## 2020-08-11 PROCEDURE — 3008F BODY MASS INDEX DOCD: CPT | Performed by: PSYCHIATRY & NEUROLOGY

## 2020-08-11 PROCEDURE — 99214 OFFICE O/P EST MOD 30 MIN: CPT | Performed by: PSYCHIATRY & NEUROLOGY

## 2020-08-11 PROCEDURE — 1036F TOBACCO NON-USER: CPT | Performed by: PSYCHIATRY & NEUROLOGY

## 2020-08-11 PROCEDURE — 3008F BODY MASS INDEX DOCD: CPT | Performed by: FAMILY MEDICINE

## 2020-08-11 RX ORDER — VENLAFAXINE HYDROCHLORIDE 75 MG/1
75 CAPSULE, EXTENDED RELEASE ORAL DAILY
Qty: 90 CAPSULE | Refills: 1 | Status: SHIPPED | OUTPATIENT
Start: 2020-08-11 | End: 2021-02-03

## 2020-08-11 RX ORDER — VENLAFAXINE HYDROCHLORIDE 37.5 MG/1
CAPSULE, EXTENDED RELEASE ORAL
Qty: 90 CAPSULE | Refills: 3 | Status: SHIPPED | OUTPATIENT
Start: 2020-08-11 | End: 2021-02-08 | Stop reason: SDUPTHER

## 2020-08-11 NOTE — PROGRESS NOTES
Review of Systems   Constitutional: Negative  HENT: Negative  Eyes: Negative  Respiratory: Negative  Cardiovascular: Negative  Gastrointestinal: Negative  Endocrine: Negative  Genitourinary: Negative  Musculoskeletal: Negative  Skin: Negative  Allergic/Immunologic: Negative  Neurological: Negative  Hematological: Negative  Psychiatric/Behavioral: Positive for behavioral problems and sleep disturbance (Trouble staying asleep )  The patient is nervous/anxious

## 2020-08-11 NOTE — PROGRESS NOTES
[facility]  PATIENT:  Jessica Noble  MRN:  [id number]  :  1975  DATE OF SERVICE:  2020      Assessment/Plan:        Problem List Items Addressed This Visit        Cardiovascular and Mediastinum    Migraine without aura and without status migrainosus, not intractable    Relevant Medications    venlafaxine (EFFEXOR-XR) 75 mg 24 hr capsule    venlafaxine (EFFEXOR-XR) 37 5 mg 24 hr capsule       Other    Depressive disorder - Primary    Relevant Medications    venlafaxine (EFFEXOR-XR) 75 mg 24 hr capsule    venlafaxine (EFFEXOR-XR) 37 5 mg 24 hr capsule      Other Visit Diagnoses     Anxiety        Relevant Medications    venlafaxine (EFFEXOR-XR) 75 mg 24 hr capsule                Syncopal event time 2:   -workup has thus far been negative  - patient is okay to drive at this time as her last event was 6 months prior      Sinus type headaches:  - Patient is currently taking Zyrtec with Aleve 220 mg in a m  Daily for the last 2 weeks  -did discuss with patient in length that we do not want to take over-the-counter medication on daily basis as that may cause medication overuse headaches  Medication overuse headaches can occur from any over-the-counter medication taking for pain for more than 3 months on daily basis  -abortive medication should be taken 3 times a week or less to prevent from developing medication overuse headaches  Depression anxiety:  - venlafaxine 75 mg plus 37 5 mg in a m  At this time  -patient is doing therapy at this time suggested that they do family therapy to help her deal with some of the concerns that they have at home with her children and with COVID-19      Migraine headaches without aura:  Preventive therapy:  -continue venlafaxine 75 mg plus 37 5 mg in am   Abortive therapy for migraine  May take Aleve or Motrin but less than 3 times a week        Neck pain:   - continue physical therapy on your own  - Neck pathology and poor posture, with straightening of the normal cervical lordosis, can cause headaches  Tightening of the neck muscles can irritate the nerves in the occipital region of the head and cause or worsen head pain  Thus neck strengthening and relaxation exercises, can help improve this particular pain  It is importance to have good posture for improving shoulder, neck, and head pain  - Here are some exercises which should take 5 minutes:     1  Standing, drop your head to one side while continuing to look ahead  Hold for 10 seconds then swap sides  Repeat twice more each side  To increase the stretch, drop the opposite shoulder  2  Standing again, lower your chin to your chest, hold for 10 and then look up to the ceiling and hold for 10  Repeat twice more  3  Next, standing straight again, look over your right shoulder and hold firm for 10 seconds, then over your left shoulder for 10  Repeat this 3 times  4  Finally, while sitting upright, bring your head forward and hold for 10, then all the way back and hold for 10  If this simple exercise does not help improve the posture, we will consider formal physical therapy in the future       Please call with any questions or concerns  Office number is [address] [place] [address] Prescription Drug Monitoring Program report was reviewed and was appropriate       History of Present Illness: We had the pleasure of evaluating Tennille Conway in neurological Follow-up today   As you know,  she is a 40 y  o  right handed female  She is   Kavya Alvarenga is here today for evaluation of her headaches       Medical history review:  - QTc - 1/29/2020 - 431  History Tobacco use:never    Syncopal event:    Last event was in February 3rd of 02/20/2020  Does not report any recurrent events since then         Depression and anxiety: she is currently on venlafaxine 75mg (since December 2019) + 37 5 mg since May of 2020      She states she started the plexus slim diet drink which has helped with her headaches  Patient tells me today that there is a lot of stress in her life at this time  As she is doing with her  with the head injury  Also has 25year-old son who is not working  She has 3 children her sinuses eldest and has to teenage daughters  Feels that due to then elevated stress in her life her memory has been affected  She states that at times she feels so stressed out that she feels as if he should not come home at all to her family  She is in a plan of hurting herself  Of note:  she is here today with her  who drove her to the appointment       Migraine headaches without aura/Tension type headaches:   What medications do you take or have you taken for your headaches? Preventive therapy used:  - mag, b2,  - venlafaxine,   - propranolol, verapamil,   Abortive therapy used:  - Fioricet,  Excedrin  - aleve,  -  tylenol,      What is your current pain level? 3/10     How often do the headaches occur - she is taking zyrtec and aleve daily for the last two weeks  2020:    Feb: 8- trigger weather and menstruation  March: 6 headaches - she had dizziness with this and states she had nausea and felt as if her worlds was spinning  She did have 5 days of dizziness with no headaches she states she has the feeling as if the world was moving and at time has problem with her balance    April: 13 headaches, She thinks that during this month that was secondary to weather and allergies  May: she is taking zyrtec and aleve am daily for the last 14 days  Thinks are more sinus type headaches  She states she has also noticed that using he new glasses for stigmatisms has helped     2019:  Summer was good   No migraines  However since school started began having migraines again and had leaking air conditioner   Discovered black mold and thinks it has been treated  Fredy Friendly been taking 2 aleve since school started and now is fine     Works 90% of the time  her headaches that get up to 9/10 comes on suddenly and gets worse quickly        Mild:  Improved with the diet drink that she is doing at this time  Moderate to severe: 1 in 3 months (Her Migraine)     Are you ever headache free? Yes     Any warning prior to headache and how long do they last? None     What time of the day do the headaches start - anytime     How long do the headaches last - typically lasts for few days  February 13, 2020 - less then 24 hours      Where are they located - right frontal     What is the intensity of pain - 1 to 10/10, over the last one year she had one that was severe and lasted for days       Describe your usual headache -  Pressure and throbbing at time     Associated symptoms:   · Decrease of appetite, nausea  · Photophobia, phonophobia  · Problem with concentration  · light-headed or dizzy  · prefer to be alone and in a dark room, unable to work     Number of days missed per month because of headaches:  Work (or school) days: none  Social or Family activities: Sometimes     Alternative therapies used in the past for headaches? chiropractic care, dietary change and physical therapy  Headache are worse if the patient: bending over  Headache triggers: No     What time of the year do headaches occur more frequently? do not seem to be related to any time of day or year  Have you seen someone else for headaches or pain? No  Have you had trigger point injection performed and how often? No  Have you had Botox injection performed and how often? No   Have you had epidural injections or transforaminal injections performed? No     Have you used CBD or THC for your headaches and how often? No     Are you current pregnant or planning on getting pregnant? No     Have you ever had any Brain imaging? no I personally reviewed these images  Recent laboratory data was reviewed    Medications and allergies were reviewed      03/10/2020-MRI of the brain with without contrast:    FINDINGS:   BRAIN PARENCHYMA:  There is no discrete mass, mass effect or midline shift  There is no intracranial hemorrhage  Normal posterior fossa  Diffusion imaging is unremarkable      There are no white matter changes in the cerebral hemispheres    Postcontrast imaging of the brain demonstrates no abnormal enhancement    VENTRICLES:  Normal for the patient's age    SELLA AND PITUITARY GLAND:  Normal    ORBITS:  Normal    PARANASAL SINUSES:  Normal    VASCULATURE:  Evaluation of the major intracranial vasculature demonstrates appropriate flow voids    CALVARIUM AND SKULL BASE:  Normal    EXTRACRANIAL SOFT TISSUES:  Normal    IMPRESSION:   Normal examination  Past Medical History:   Diagnosis Date    Migraine        Patient Active Problem List   Diagnosis    Migraine without aura and without status migrainosus, not intractable    Tension headache    Vertiginous migraine    Vitamin D deficiency    Adjustment disorder with mixed anxiety and depressed mood    Depressive disorder    GERD (gastroesophageal reflux disease)    Neck pain    Panic attack    Vasovagal syncope       Medications:      Current Outpatient Medications   Medication Sig Dispense Refill    NON FORMULARY Take 1 Dose by mouth 2 (two) times a day Plexus Slim 1 drink in the AM and 2 tablets at bedtime   venlafaxine (EFFEXOR-XR) 37 5 mg 24 hr capsule One capsule in am daily with her 75mg dose  90 capsule 3    venlafaxine (EFFEXOR-XR) 75 mg 24 hr capsule Take 1 capsule (75 mg total) by mouth daily 90 capsule 1    albuterol (Proventil HFA) 90 mcg/act inhaler Inhale 2 puffs as needed       cetirizine (ZyrTEC) 10 mg tablet Take 10 mg by mouth daily      methylPREDNISolone 4 MG tablet therapy pack Use as directed on package (Patient not taking: Reported on 8/11/2020) 21 each 0    Naproxen Sodium (Aleve) 220 MG CAPS Take 220 mg by mouth as needed       No current facility-administered medications for this visit           Allergies:    No Known Allergies    Family History: Family History   Problem Relation Age of Onset    Clotting disorder Mother     No Known Problems Father        Social History:     Social History     Socioeconomic History    Marital status: /Civil Union     Spouse name: Not on file    Number of children: Not on file    Years of education: Not on file    Highest education level: Not on file   Occupational History    Not on file   Social Needs    Financial resource strain: Not on file    Food insecurity     Worry: Not on file     Inability: Not on file   [employer] needs     Medical: Not on file     Non-medical: Not on file   Tobacco Use    Smoking status: Never Smoker    Smokeless tobacco: Never Used   Substance and Sexual Activity    Alcohol use: No    Drug use: No    Sexual activity: Not Currently   Lifestyle    Physical activity     Days per week: Not on file     Minutes per session: Not on file    Stress: Not on file   Relationships    Social connections     Talks on phone: Not on file     Gets together: Not on file     Attends Bahai service: Not on file     Active member of club or organization: Not on file     Attends meetings of clubs or organizations: Not on file     Relationship status: Not on file    Intimate partner violence     Fear of current or ex partner: Not on file     Emotionally abused: Not on file     Physically abused: Not on file     Forced sexual activity: Not on file   Other Topics Concern    Not on file   Social History Narrative    Not on file         Objective:   Physical Exam:                                                                   Vitals:               /77 (BP Location: Left arm, Patient Position: Sitting, Cuff Size: Standard)   Pulse 73   Temp 98 3 °F (36 8 °C) (Temporal)   Ht 5' (1 524 m)   Wt 67 7 kg (149 lb 4 8 oz)   BMI 29 16 kg/m²   BP Readings from Last 3 Encounters:   08/11/20 134/77   05/18/20 126/81   02/21/20 129/64     Pulse Readings from Last 3 Encounters: 08/11/20 73   05/18/20 86   02/21/20 92              CONSTITUTIONAL: Well developed, well nourished, well groomed  No dysmorphic features  Eyes:  PERRLA, EOM normal      Neck:  Normal ROM, neck supple  HEENT:  Normocephalic atraumatic  No meningismus  Oropharynx is clear and moist  No oral mucosal lesions  Chest:  Respirations regular and unlabored  Cardiovascular:  Distal extremities warm without palpable edema or tenderness, no observed significant swelling  Musculoskeletal:  Full range of motion  Skin:  warm and dry   Psychiatric:  Normal behavior and appropriate affect      Neurological Examination:   Mental status/cognitive function: Orientated to time, place and person  Cranial Nerves: 2 to 12 intact    Motor Exam:  Moving all extremities without any difficulty    Sensory:  Intact to light touch throughout    Reflexes:  Not checked today    Coordination: Finger to nose intact bilaterally, no tremor noted    Gait: - Steady casual gait      Review of Systems:   Review of Systems  Constitutional: Negative  HENT: Negative  Eyes: Negative  Respiratory: Negative  Cardiovascular: Negative  Gastrointestinal: Negative  Endocrine: Negative  Genitourinary: Negative  Musculoskeletal: Negative  Skin: Negative  Allergic/Immunologic: Negative  Neurological: Negative  Hematological: Negative  Psychiatric/Behavioral: Positive for behavioral problems and sleep disturbance (Trouble staying asleep )  The patient is nervous/anxious  I have spent 25 minutes with Patient  today in which greater than 50% of this time was spent in counseling/coordination of care regarding Diagnostic results, Prognosis, Risks and benefits of tx options, Intructions for management, Patient and family education, Importance of tx compliance, Risk factor reductions, Impressions and Plan of care as above        Author:  Lisa Vazquez MD 8/11/2020 9:46 AM

## 2020-08-27 ENCOUNTER — TELEMEDICINE (OUTPATIENT)
Dept: FAMILY MEDICINE CLINIC | Facility: CLINIC | Age: 45
End: 2020-08-27
Payer: COMMERCIAL

## 2020-08-27 DIAGNOSIS — N39.0 URINARY TRACT INFECTION WITHOUT HEMATURIA, SITE UNSPECIFIED: Primary | ICD-10-CM

## 2020-08-27 PROCEDURE — 3725F SCREEN DEPRESSION PERFORMED: CPT | Performed by: FAMILY MEDICINE

## 2020-08-27 PROCEDURE — 99213 OFFICE O/P EST LOW 20 MIN: CPT | Performed by: FAMILY MEDICINE

## 2020-08-27 PROCEDURE — 1036F TOBACCO NON-USER: CPT | Performed by: FAMILY MEDICINE

## 2020-08-27 RX ORDER — SULFAMETHOXAZOLE AND TRIMETHOPRIM 800; 160 MG/1; MG/1
1 TABLET ORAL EVERY 12 HOURS SCHEDULED
Qty: 10 TABLET | Refills: 0 | Status: SHIPPED | OUTPATIENT
Start: 2020-08-27 | End: 2020-09-01

## 2020-08-27 NOTE — PROGRESS NOTES
Virtual Regular Visit      Assessment/Plan:    Problem List Items Addressed This Visit     None      Visit Diagnoses     Urinary tract infection without hematuria, site unspecified    -  Primary    Relevant Medications    sulfamethoxazole-trimethoprim (BACTRIM DS) 800-160 mg per tablet               Reason for visit is   Chief Complaint   Patient presents with    Urinary Tract Infection    Virtual Regular Visit        Encounter provider Vanessa Pineda DO    Provider located at [address]  [address]  [address] , [address] [id number] [phone]      Recent Visits  No visits were found meeting these conditions  Showing recent visits within past 7 days and meeting all other requirements     Today's Visits  Date Type Provider Dept   08/27/20 Telemedicine Walker Álvarez DO [facility]   Showing today's visits and meeting all other requirements     Future Appointments  No visits were found meeting these conditions  Showing future appointments within next 150 days and meeting all other requirements        The patient was identified by name and date of birth  Kayley Davis was informed that this is a telemedicine visit and that the visit is being conducted through [facility] and patient was informed that this is a secure, HIPAA-compliant platform  She agrees to proceed     My office door was closed  No one else was in the room  She acknowledged consent and understanding of privacy and security of the video platform  The patient has agreed to participate and understands they can discontinue the visit at any time  Patient is aware this is a billable service  Analilia Hernandez is a 40 y o  female presents today for urinary symptoms         Patient planing of UTI for 2 weeks  She states she is having burning she usually is been taking azo which helps some of days her symptoms are not as bad  Last night she was unable sleep and she has been urinating quite frequently and having a lot of burning       Past Medical History:   Diagnosis Date    Migraine        Past Surgical History:   Procedure Laterality Date    APPENDECTOMY       SECTION      TUBAL LIGATION         Current Outpatient Medications   Medication Sig Dispense Refill    venlafaxine (EFFEXOR-XR) 37 5 mg 24 hr capsule One capsule in am daily with her 75mg dose  90 capsule 3    venlafaxine (EFFEXOR-XR) 75 mg 24 hr capsule Take 1 capsule (75 mg total) by mouth daily 90 capsule 1    albuterol (Proventil HFA) 90 mcg/act inhaler Inhale 2 puffs as needed       cetirizine (ZyrTEC) 10 mg tablet Take 10 mg by mouth daily      methylPREDNISolone 4 MG tablet therapy pack Use as directed on package (Patient not taking: Reported on 2020) 21 each 0    Naproxen Sodium (Aleve) 220 MG CAPS Take 220 mg by mouth as needed      NON FORMULARY Take 1 Dose by mouth 2 (two) times a day Plexus Slim 1 drink in the AM and 2 tablets at bedtime   sulfamethoxazole-trimethoprim (BACTRIM DS) 800-160 mg per tablet Take 1 tablet by mouth every 12 (twelve) hours for 5 days 10 tablet 0     No current facility-administered medications for this visit  No Known Allergies    Review of Systems   Constitutional: Negative  HENT: Negative  Eyes: Negative  Respiratory: Negative  Cardiovascular: Negative  Gastrointestinal: Negative  Endocrine: Negative  Genitourinary: Positive for dysuria  Musculoskeletal: Negative  Skin: Negative  Allergic/Immunologic: Negative  Neurological: Negative  Hematological: Negative  Psychiatric/Behavioral: Negative  All other systems reviewed and are negative  Video Exam    There were no vitals filed for this visit  Physical Exam  Vitals signs and nursing note reviewed  Constitutional:       Appearance: She is well-developed  HENT:      Head: Normocephalic and atraumatic        Right Ear: External ear normal       Left Ear: External ear normal    Eyes:      Conjunctiva/sclera: Conjunctivae normal       Pupils: Pupils are equal, round, and reactive to light  Neck:      Musculoskeletal: Normal range of motion  Pulmonary:      Effort: Pulmonary effort is normal    Musculoskeletal: Normal range of motion  Skin:     General: Skin is warm and dry  Neurological:      Mental Status: She is alert and oriented to person, place, and time  Deep Tendon Reflexes: Reflexes are normal and symmetric  Psychiatric:         Behavior: Behavior normal          Thought Content: Thought content normal          Judgment: Judgment normal           I spent Ten minutes directly with the patient during this visit      [address] acknowledges that she has consented to an online visit or consultation  She understands that the online visit is based solely on information provided by her, and that, in the absence of a face-to-face physical evaluation by the physician, the diagnosis she receives is both limited and provisional in terms of accuracy and completeness  This is not intended to replace a full medical face-to-face evaluation by the physician  Jessica Noble understands and accepts these terms

## 2020-10-15 DIAGNOSIS — F41.1 GAD (GENERALIZED ANXIETY DISORDER): Primary | ICD-10-CM

## 2020-10-15 RX ORDER — ALPRAZOLAM 0.25 MG/1
0.25 TABLET ORAL 2 TIMES DAILY PRN
Qty: 60 TABLET | Refills: 0 | Status: SHIPPED | OUTPATIENT
Start: 2020-10-15 | End: 2021-04-21

## 2020-10-19 DIAGNOSIS — F39 MOOD DISORDER (HCC): Primary | ICD-10-CM

## 2020-10-19 RX ORDER — LAMOTRIGINE 25 MG/1
25 TABLET ORAL DAILY
Qty: 30 TABLET | Refills: 3 | Status: SHIPPED | OUTPATIENT
Start: 2020-10-19 | End: 2021-02-08 | Stop reason: SDUPTHER

## 2020-10-22 ENCOUNTER — TELEPHONE (OUTPATIENT)
Dept: NEUROLOGY | Facility: CLINIC | Age: 45
End: 2020-10-22

## 2020-10-23 ENCOUNTER — TELEPHONE (OUTPATIENT)
Dept: FAMILY MEDICINE CLINIC | Facility: CLINIC | Age: 45
End: 2020-10-23

## 2020-11-03 ENCOUNTER — OFFICE VISIT (OUTPATIENT)
Dept: NEUROLOGY | Facility: CLINIC | Age: 45
End: 2020-11-03
Payer: COMMERCIAL

## 2020-11-03 ENCOUNTER — TELEPHONE (OUTPATIENT)
Dept: NEUROLOGY | Facility: CLINIC | Age: 45
End: 2020-11-03

## 2020-11-03 VITALS
TEMPERATURE: 98.3 F | DIASTOLIC BLOOD PRESSURE: 74 MMHG | WEIGHT: 147.6 LBS | SYSTOLIC BLOOD PRESSURE: 138 MMHG | HEART RATE: 88 BPM | BODY MASS INDEX: 28.98 KG/M2 | HEIGHT: 60 IN

## 2020-11-03 DIAGNOSIS — G43.009 MIGRAINE WITHOUT AURA AND WITHOUT STATUS MIGRAINOSUS, NOT INTRACTABLE: Primary | ICD-10-CM

## 2020-11-03 DIAGNOSIS — G44.209 TENSION HEADACHE: ICD-10-CM

## 2020-11-03 PROCEDURE — 1036F TOBACCO NON-USER: CPT | Performed by: PSYCHIATRY & NEUROLOGY

## 2020-11-03 PROCEDURE — 3008F BODY MASS INDEX DOCD: CPT | Performed by: PSYCHIATRY & NEUROLOGY

## 2020-11-03 PROCEDURE — 99214 OFFICE O/P EST MOD 30 MIN: CPT | Performed by: PSYCHIATRY & NEUROLOGY

## 2020-11-07 DIAGNOSIS — F41.9 ANXIETY: ICD-10-CM

## 2020-11-07 DIAGNOSIS — F41.1 GAD (GENERALIZED ANXIETY DISORDER): ICD-10-CM

## 2020-11-09 RX ORDER — ALPRAZOLAM 0.5 MG/1
TABLET ORAL
Qty: 60 TABLET | Refills: 0 | OUTPATIENT
Start: 2020-11-09

## 2020-11-09 RX ORDER — VENLAFAXINE HYDROCHLORIDE 75 MG/1
CAPSULE, EXTENDED RELEASE ORAL
Qty: 90 CAPSULE | Refills: 1 | OUTPATIENT
Start: 2020-11-09

## 2020-11-11 ENCOUNTER — TELEPHONE (OUTPATIENT)
Dept: NEUROLOGY | Facility: CLINIC | Age: 45
End: 2020-11-11

## 2020-12-09 ENCOUNTER — TELEPHONE (OUTPATIENT)
Dept: FAMILY MEDICINE CLINIC | Facility: CLINIC | Age: 45
End: 2020-12-09

## 2020-12-09 DIAGNOSIS — M54.2 NECK PAIN: Primary | ICD-10-CM

## 2020-12-09 RX ORDER — CYCLOBENZAPRINE HCL 5 MG
5 TABLET ORAL 3 TIMES DAILY PRN
Qty: 30 TABLET | Refills: 0 | Status: SHIPPED | OUTPATIENT
Start: 2020-12-09 | End: 2021-05-12

## 2020-12-11 ENCOUNTER — TELEMEDICINE (OUTPATIENT)
Dept: FAMILY MEDICINE CLINIC | Facility: CLINIC | Age: 45
End: 2020-12-11
Payer: COMMERCIAL

## 2020-12-11 DIAGNOSIS — M54.2 NECK PAIN: Primary | ICD-10-CM

## 2020-12-11 PROCEDURE — 99441 PR PHYS/QHP TELEPHONE EVALUATION 5-10 MIN: CPT | Performed by: FAMILY MEDICINE

## 2020-12-29 ENCOUNTER — APPOINTMENT (OUTPATIENT)
Dept: RADIOLOGY | Facility: CLINIC | Age: 45
End: 2020-12-29
Payer: COMMERCIAL

## 2020-12-29 DIAGNOSIS — M54.2 NECK PAIN: ICD-10-CM

## 2020-12-29 PROCEDURE — 72050 X-RAY EXAM NECK SPINE 4/5VWS: CPT

## 2021-01-15 DIAGNOSIS — M54.2 NECK PAIN: Primary | ICD-10-CM

## 2021-01-15 RX ORDER — METHYLPREDNISOLONE 4 MG/1
TABLET ORAL
Qty: 1 EACH | Refills: 0 | Status: SHIPPED | OUTPATIENT
Start: 2021-01-15 | End: 2021-04-21 | Stop reason: SDUPTHER

## 2021-02-01 DIAGNOSIS — F41.9 ANXIETY: ICD-10-CM

## 2021-02-03 RX ORDER — VENLAFAXINE HYDROCHLORIDE 75 MG/1
CAPSULE, EXTENDED RELEASE ORAL
Qty: 90 CAPSULE | Refills: 1 | Status: SHIPPED | OUTPATIENT
Start: 2021-02-03 | End: 2021-08-02

## 2021-02-08 DIAGNOSIS — G43.009 MIGRAINE WITHOUT AURA AND WITHOUT STATUS MIGRAINOSUS, NOT INTRACTABLE: ICD-10-CM

## 2021-02-08 DIAGNOSIS — F39 MOOD DISORDER (HCC): ICD-10-CM

## 2021-02-08 RX ORDER — LAMOTRIGINE 25 MG/1
25 TABLET ORAL
Qty: 90 TABLET | Refills: 1 | Status: SHIPPED | OUTPATIENT
Start: 2021-02-08 | End: 2021-05-12

## 2021-02-08 RX ORDER — VENLAFAXINE HYDROCHLORIDE 37.5 MG/1
CAPSULE, EXTENDED RELEASE ORAL
Qty: 90 CAPSULE | Refills: 1 | Status: SHIPPED | OUTPATIENT
Start: 2021-02-08 | End: 2021-05-12

## 2021-03-03 ENCOUNTER — TELEPHONE (OUTPATIENT)
Dept: NEUROLOGY | Facility: CLINIC | Age: 46
End: 2021-03-03

## 2021-03-03 NOTE — TELEPHONE ENCOUNTER
pt called and states that for the   last several months she has had pretty bad headaches along with menstrual cycle  yesterday had a really bad migraine  has headaches atleast half of the day everyday   for the past month but worse with menstrual cycle  she states that she was previously on a low dose of medication to help with this  she believes we prescribed this but unsure what it was  effexor 75mg 1 tab daily-prescribed by our office  effexor 37 5mg 1 tab daily-precribed by pcp  lamotrigine 25mg 1 tab daily-stopped taking it for about the last month-prescribed by pcp   pcp told her to take it as needed for her emotions    not currently having a migriane but does have a headache   2/10   no other symptoms  please advise  [phone] to leave detailed message

## 2021-03-05 NOTE — TELEPHONE ENCOUNTER
Left detailed message regarding below    Asked that she call back if she wants us to send depakote and to schedule and appt

## 2021-03-05 NOTE — TELEPHONE ENCOUNTER
Patient should come in for a follow-up if she is not taking her medication as prescribed from the last visit  For her menstrual migraines until then patient could take naproxen  500 mg in a m each morning to prevent the menstrual migraines from occurring  Could also try Depakote during menstrual cycle at bedtime to see if that helps      Thank you

## 2021-03-05 NOTE — TELEPHONE ENCOUNTER
Patient calling in  She is agreeable to below, please send to pharmacy, thanks!   Patient requesting Wed afternoon in [facility]    Scheduled for 5/12 w Bobby Bowens

## 2021-03-08 DIAGNOSIS — G43.009 MIGRAINE WITHOUT AURA AND WITHOUT STATUS MIGRAINOSUS, NOT INTRACTABLE: Primary | ICD-10-CM

## 2021-03-08 RX ORDER — NAPROXEN 500 MG/1
TABLET ORAL
Qty: 15 TABLET | Refills: 0 | Status: SHIPPED | OUTPATIENT
Start: 2021-03-08 | End: 2021-08-03 | Stop reason: SDUPTHER

## 2021-03-08 RX ORDER — DIVALPROEX SODIUM 500 MG/1
TABLET, DELAYED RELEASE ORAL
Qty: 10 TABLET | Refills: 1 | Status: SHIPPED | OUTPATIENT
Start: 2021-03-08 | End: 2021-05-12

## 2021-04-21 ENCOUNTER — OFFICE VISIT (OUTPATIENT)
Dept: FAMILY MEDICINE CLINIC | Facility: CLINIC | Age: 46
End: 2021-04-21
Payer: COMMERCIAL

## 2021-04-21 VITALS
TEMPERATURE: 98.4 F | WEIGHT: 160.2 LBS | DIASTOLIC BLOOD PRESSURE: 80 MMHG | BODY MASS INDEX: 31.45 KG/M2 | HEIGHT: 60 IN | SYSTOLIC BLOOD PRESSURE: 120 MMHG | HEART RATE: 110 BPM | OXYGEN SATURATION: 99 % | RESPIRATION RATE: 18 BRPM

## 2021-04-21 DIAGNOSIS — M54.2 NECK PAIN: ICD-10-CM

## 2021-04-21 DIAGNOSIS — F43.23 ADJUSTMENT DISORDER WITH MIXED ANXIETY AND DEPRESSED MOOD: ICD-10-CM

## 2021-04-21 DIAGNOSIS — G43.009 MIGRAINE WITHOUT AURA AND WITHOUT STATUS MIGRAINOSUS, NOT INTRACTABLE: ICD-10-CM

## 2021-04-21 DIAGNOSIS — H69.83 DYSFUNCTION OF BOTH EUSTACHIAN TUBES: Primary | ICD-10-CM

## 2021-04-21 PROCEDURE — 3008F BODY MASS INDEX DOCD: CPT | Performed by: FAMILY MEDICINE

## 2021-04-21 PROCEDURE — 3725F SCREEN DEPRESSION PERFORMED: CPT | Performed by: FAMILY MEDICINE

## 2021-04-21 PROCEDURE — 1036F TOBACCO NON-USER: CPT | Performed by: FAMILY MEDICINE

## 2021-04-21 PROCEDURE — 99214 OFFICE O/P EST MOD 30 MIN: CPT | Performed by: FAMILY MEDICINE

## 2021-04-21 RX ORDER — METHYLPREDNISOLONE 4 MG/1
TABLET ORAL
Qty: 1 EACH | Refills: 0 | Status: SHIPPED | OUTPATIENT
Start: 2021-04-21

## 2021-04-21 NOTE — PROGRESS NOTES
Assessment/Plan:     Diagnoses and all orders for this visit:    Dysfunction of both eustachian tubes    Migraine without aura and without status migrainosus, not intractable    Adjustment disorder with mixed anxiety and depressed mood    Neck pain  -     methylPREDNISolone 4 MG tablet therapy pack; Use as directed on package       Steroid pack ordered   Patient will follow-up with Neurology for her headaches   Otherwise her mood has been stable   She still has fits of rage in terms of her  but otherwise work has been not a problem      Subjective:     Chief Complaint   Patient presents with    Earache     right ear pain for 3 weeks, patient states it sounds like "paper scratching" inside  Patient ID: Gene Aleman is a 39 y o  female  Patient states she still feels  some strange sensation in her ears  Her ears feel full   She has no other acute complaints today  She still follows with Neurology for her chronic migraines her mood has been stable lately he is mostly only taking the venlafaxine        The following portions of the patient's history were reviewed and updated as appropriate: allergies, current medications, past family history, past medical history, past social history, past surgical history and problem list     Review of Systems   Constitutional: Negative  HENT: Negative  Eyes: Negative  Respiratory: Negative  Cardiovascular: Negative  Gastrointestinal: Negative  Endocrine: Negative  Genitourinary: Negative  Musculoskeletal: Negative  Skin: Negative  Allergic/Immunologic: Negative  Neurological: Negative  Hematological: Negative  Psychiatric/Behavioral: Negative  All other systems reviewed and are negative          Objective:    Vitals:    04/21/21 1502   BP: 120/80   BP Location: Right arm   Patient Position: Sitting   Cuff Size: Large   Pulse: (!) 110   Resp: 18   Temp: 98 4 °F (36 9 °C)   TempSrc: Tympanic   SpO2: 99%   Weight: 72 7 kg (160 lb 3 2 oz)   Height: 5' (1 524 m)          Physical Exam  Vitals signs and nursing note reviewed  Constitutional:       Appearance: She is well-developed  HENT:      Head: Normocephalic and atraumatic  Right Ear: External ear normal       Left Ear: External ear normal    Eyes:      Conjunctiva/sclera: Conjunctivae normal       Pupils: Pupils are equal, round, and reactive to light  Neck:      Musculoskeletal: Normal range of motion  Cardiovascular:      Rate and Rhythm: Normal rate and regular rhythm  Heart sounds: Normal heart sounds  Pulmonary:      Effort: Pulmonary effort is normal       Breath sounds: Normal breath sounds  Abdominal:      General: Bowel sounds are normal       Palpations: Abdomen is soft  Musculoskeletal: Normal range of motion  Skin:     General: Skin is warm and dry  Neurological:      Mental Status: She is alert and oriented to person, place, and time  Deep Tendon Reflexes: Reflexes are normal and symmetric  Psychiatric:         Behavior: Behavior normal          Thought Content: Thought content normal          Judgment: Judgment normal            BMI Counseling: Body mass index is 31 29 kg/m²  The BMI is above normal  Nutrition recommendations include reducing portion sizes, decreasing overall calorie intake, 3-5 servings of fruits/vegetables daily, reducing fast food intake, consuming healthier snacks, decreasing soda and/or juice intake, moderation in carbohydrate intake, increasing intake of lean protein, reducing intake of saturated fat and trans fat and reducing intake of cholesterol  Exercise recommendations include exercising 3-5 times per week

## 2021-04-21 NOTE — PATIENT INSTRUCTIONS
Heart Healthy Diet   AMBULATORY CARE:   A heart healthy diet  is an eating plan low in unhealthy fats and sodium (salt)  The plan is high in healthy fats and fiber  A heart healthy diet helps improve your cholesterol levels and lowers your risk for heart disease and stroke  A dietitian will teach you how to read and understand food labels  Heart healthy diet guidelines to follow:   · Choose foods that contain healthy fats  ? Unsaturated fats  include monounsaturated and polyunsaturated fats  Unsaturated fat is found in foods such as soybean, canola, olive, corn, and safflower oils  It is also found in soft tub margarine that is made with liquid vegetable oil  ? Omega-3 fat  is found in certain fish, such as salmon, tuna, and trout, and in walnuts and flaxseed  Eat fish high in omega-3 fats at least 2 times a week  · Get 20 to 30 grams of fiber each day  Fruits, vegetables, whole-grain foods, and legumes (cooked beans) are good sources of fiber  · Limit or do not have unhealthy fats  ? Cholesterol  is found in animal foods, such as eggs and lobster, and in dairy products made from whole milk  Limit cholesterol to less than 200 mg each day  ? Saturated fat  is found in meats, such as garza and hamburger  It is also found in chicken or turkey skin, whole milk, and butter  Limit saturated fat to less than 7% of your total daily calories  ? Trans fat  is found in packaged foods, such as potato chips and cookies  It is also in hard margarine, some fried foods, and shortening  Do not eat foods that contain trans fats  · Limit sodium as directed  You may be told to limit sodium to 2,000 to 2,300 mg each day  Choose low-sodium or no-salt-added foods  Add little or no salt to food you prepare  Use herbs and spices in place of salt         Include the following in your heart healthy plan:  Ask your dietitian or healthcare provider how many servings to have from each of the following food groups:  · Grains:      ? Whole-wheat breads, cereals, and pastas, and brown rice    ? Low-fat, low-sodium crackers and chips    · Vegetables:      ? Broccoli, green beans, green peas, and spinach    ? Collards, kale, and lima beans    ? Carrots, sweet potatoes, tomatoes, and peppers    ? Canned vegetables with no salt added    · Fruits:      ? Bananas, peaches, pears, and pineapple    ? Grapes, raisins, and dates    ? Oranges, tangerines, grapefruit, orange juice, and grapefruit juice    ? Apricots, mangoes, melons, and papaya    ? Raspberries and strawberries    ? Canned fruit with no added sugar    · Low-fat dairy:      ? Nonfat (skim) milk, 1% milk, and low-fat almond, cashew, or soy milks fortified with calcium    ? Low-fat cheese, regular or frozen yogurt, and cottage cheese    · Meats and proteins:      ? Lean cuts of beef and pork (loin, leg, round), skinless chicken and turkey    ? Legumes, soy products, egg whites, or nuts    Limit or do not include the following in your heart healthy plan:   · Unhealthy fats and oils:      ? Whole or 2% milk, cream cheese, sour cream, or cheese    ? High-fat cuts of beef (T-bone steaks, ribs), chicken or turkey with skin, and organ meats such as liver    ? Butter, stick margarine, shortening, and cooking oils such as coconut or palm oil    · Foods and liquids high in sodium:      ? Packaged foods, such as frozen dinners, cookies, macaroni and cheese, and cereals with more than 300 mg of sodium per serving    ? Vegetables with added sodium, such as instant potatoes, vegetables with added sauces, or regular canned vegetables    ? Cured or smoked meats, such as hot dogs, garza, and sausage    ? High-sodium ketchup, barbecue sauce, salad dressing, pickles, olives, soy sauce, or miso    · Foods and liquids high in sugar:      ? Candy, cake, cookies, pies, or doughnuts    ? Soft drinks (soda), sports drinks, or sweetened tea    ?  Canned or dry mixes for cakes, soups, sauces, or gravies    Other healthy heart guidelines:   · Do not smoke  Nicotine and other chemicals in cigarettes and cigars can cause lung and heart damage  Ask your healthcare provider for information if you currently smoke and need help to quit  E-cigarettes or smokeless tobacco still contain nicotine  Talk to your healthcare provider before you use these products  · Limit or do not drink alcohol as directed  Alcohol can damage your heart and raise your blood pressure  Your healthcare provider may give you specific daily and weekly limits  The general recommended limit is 1 drink a day for women 21 or older and for men 72 or older  Do not have more than 3 drinks in a day or 7 in a week  The recommended limit is 2 drinks a day for men 24to 59years of age  Do not have more than 4 drinks in a day or 14 in a week  A drink of alcohol is 12 ounces of beer, 5 ounces of wine, or 1½ ounces of liquor  · Exercise regularly  Exercise can help you maintain a healthy weight and improve your blood pressure and cholesterol levels  Regular exercise can also decrease your risk for heart problems  Ask your healthcare provider about the best exercise plan for you  Do not start an exercise program without asking your healthcare provider  Follow up with your doctor or cardiologist as directed:  Write down your questions so you remember to ask them during your visits  © Copyright [address] Information is for End User's use only and may not be sold, redistributed or otherwise used for commercial purposes  All illustrations and images included in CareNotes® are the copyrighted property of A D A M , Inc  or [address] [place], [place] [address]   The above information is an  only  It is not intended as medical advice for individual conditions or treatments  Talk to your doctor, nurse or pharmacist before following any medical regimen to see if it is safe and effective for you      Calorie Counting Diet   WHAT YOU NEED TO KNOW:   What is a calorie counting diet? It is a meal plan based on counting calories each day to reach a healthy body weight  You will need to eat fewer calories if you are trying to lose weight  Weight loss may decrease your risk for certain health problems or improve your health if you have health problems  Some of these health problems include heart disease, high blood pressure, and diabetes  What foods should I avoid? Your dietitian will tell you if you need to avoid certain foods based on your body weight and health condition  You may need to avoid high-fat foods if you are at risk for or have heart disease  You may need to eat fewer foods from the breads and starches food group if you have diabetes  How many calories are in foods? The following is a list of foods and drinks with the approximate number of calories in each  Check the food label to find the exact number of calories  A dietitian can tell you how many calories you should have from each food group each day  · Carbohydrate:      ? ½ of a 3-inch bagel, 1 slice of bread, or ½ of a hamburger bun or hot dog bun (80)    ? 1 (8-inch) flour tortilla or ½ cup of cooked rice (100)    ? 1 (6-inch) corn tortilla (80)    ? 1 (6-inch) pancake or 1 cup of bran flakes cereal (110)    ? ½ cup of cooked cereal (80)    ? ½ cup of cooked pasta (85)    ? 1 ounce of pretzels (100)    ? 3 cups of air-popped popcorn without butter or oil (80)    · Dairy:      ? 1 cup of skim or 1% milk (90)    ? 1 cup of 2% milk (120)    ? 1 cup of whole milk (160)    ? 1 cup of 2% chocolate milk (220)    ? 1 ounce of low-fat cheese with 3 grams of fat per ounce (70)    ? 1 ounce of cheddar cheese (114)    ? ½ cup of 1% fat cottage cheese (80)    ? 1 cup of plain or sugar-free, fat-free yogurt (90)    · Protein foods:      ? 3 ounces of fish (not breaded or fried) (95)    ? 3 ounces of breaded, fried fish (195)    ? ¾ cup of tuna canned in water (105)    ?  3 ounces of chicken breast without skin (105)    ? 1 fried chicken breast with skin (350)    ? ¼ cup of fat free egg substitute (40)    ? 1 large egg (75)    ? 3 ounces of lean beef or pork (165)    ? 3 ounces of fried pork chop or ham (185)    ? ½ cup of cooked dried beans, such as kidney, conklin, lentils, or navy (115)    ? 3 ounces of bologna or lunch meat (225)    ? 2 links of breakfast sausage (140)    · Vegetables:      ? ½ cup of sliced mushrooms (10)    ? 1 cup of salad greens, such as lettuce, spinach, or sherman (15)    ? ½ cup of steamed asparagus (20)    ? ½ cup of cooked summer squash, zucchini squash, or green or wax beans (25)    ? 1 cup of broccoli or cauliflower florets, or 1 medium tomato (25)    ? 1 large raw carrot or ½ cup of cooked carrots (40)    ? ? of a medium cucumber or 1 stalk of celery (5)    ? 1 small baked potato (160)    ? 1 cup of breaded, fried vegetables (230)    · Fruit:      ? 1 (6-inch) banana (55)     ? ½ of a 4-inch grapefruit (55)    ? 15 grapes (60)    ? 1 medium orange or apple (70)    ? 1 large peach (65)    ? 1 cup of fresh pineapple chunks (75)    ? 1 cup of melon cubes (50)    ? 1¼ cups of whole strawberries (45)    ? ½ cup of fruit canned in juice (55)    ? ½ cup of fruit canned in heavy syrup (110)    ? ? cup of raisins (130)    ? ½ cup of unsweetened fruit juice (60)    ? ½ cup of grape, cranberry, or prune juice (90)    · Fat:      ? 10 peanuts or 2 teaspoons of peanut butter (55)    ? 2 tablespoons of avocado or 1 tablespoon of regular salad dressing (45)    ? 2 slices of garza (90)    ? 1 teaspoon of oil, such as safflower, canola, corn, or olive oil (45)    ? 2 teaspoons of low-fat margarine, or 1 tablespoon of low-fat mayonnaise (50)    ? 1 teaspoon of regular margarine (40)    ? 1 tablespoon of regular mayonnaise (135)    ? 1 tablespoon of cream cheese or 2 tablespoons of low-fat cream cheese (45)    ?  2 tablespoons of vegetable shortening (215)    · Dessert and sweets:      ? 8 animal crackers or 5 vanilla wafers (80)    ? 1 frozen fruit juice bar (80)    ? ½ cup of ice milk or low-fat frozen yogurt (90)    ? ½ cup of sherbet or sorbet (125)    ? ½ cup of sugar-free pudding or custard (60)    ? ½ cup of ice cream (140)    ? ½ cup of pudding or custard (175)    ? 1 (2-inch) square chocolate brownie (185)    · Combination foods:      ? Bean burrito made with an 8-inch tortilla, without cheese (275)    ? Chicken breast sandwich with lettuce and tomato (325)    ? 1 cup of chicken noodle soup (60)    ? 1 beef taco (175)    ? Regular hamburger with lettuce and tomato (310)    ? Regular cheeseburger with lettuce and tomato (410)     ? ¼ of a 12-inch cheese pizza (280)    ? Fried fish sandwich with lettuce and tomato (425)    ? Hot dog and bun (275)    ? 1½ cups of macaroni and cheese (310)    ? Taco salad with a fried tortilla shell (870)    · Low-calorie foods:      ? 1 tablespoon of ketchup or 1 tablespoon of fat free sour cream (15)    ? 1 teaspoon of mustard (5)    ? ¼ cup of salsa (20)    ? 1 large dill pickle (15)    ? 1 tablespoon of fat free salad dressing (10)    ? 2 teaspoons of low-sugar, light jam or jelly, or 1 tablespoon of sugar-free syrup (15)    ? 1 sugar-free popsicle (15)    ? 1 cup of club soda, seltzer water, or diet soda (0)    CARE AGREEMENT:   You have the right to help plan your care  Discuss treatment options with your healthcare provider to decide what care you want to receive  You always have the right to refuse treatment  The above information is an  only  It is not intended as medical advice for individual conditions or treatments  Talk to your doctor, nurse or pharmacist before following any medical regimen to see if it is safe and effective for you  © Copyright [address] Information is for End User's use only and may not be sold, redistributed or otherwise used for commercial purposes   All illustrations and images included in [facility] are the copyrighted property of A D A M , Inc  or [address] [place], [place] [address] [facility] [address]

## 2021-05-12 ENCOUNTER — OFFICE VISIT (OUTPATIENT)
Dept: NEUROLOGY | Facility: CLINIC | Age: 46
End: 2021-05-12
Payer: COMMERCIAL

## 2021-05-12 VITALS
TEMPERATURE: 98.7 F | WEIGHT: 159.8 LBS | HEART RATE: 93 BPM | SYSTOLIC BLOOD PRESSURE: 141 MMHG | DIASTOLIC BLOOD PRESSURE: 72 MMHG | HEIGHT: 60 IN | BODY MASS INDEX: 31.37 KG/M2

## 2021-05-12 DIAGNOSIS — F43.23 ADJUSTMENT DISORDER WITH MIXED ANXIETY AND DEPRESSED MOOD: ICD-10-CM

## 2021-05-12 DIAGNOSIS — G43.009 MIGRAINE WITHOUT AURA AND WITHOUT STATUS MIGRAINOSUS, NOT INTRACTABLE: Primary | ICD-10-CM

## 2021-05-12 DIAGNOSIS — E55.9 VITAMIN D DEFICIENCY: ICD-10-CM

## 2021-05-12 DIAGNOSIS — F41.0 PANIC ATTACK: ICD-10-CM

## 2021-05-12 DIAGNOSIS — F32.A DEPRESSIVE DISORDER: ICD-10-CM

## 2021-05-12 PROCEDURE — 99215 OFFICE O/P EST HI 40 MIN: CPT | Performed by: PHYSICIAN ASSISTANT

## 2021-05-12 PROCEDURE — 1036F TOBACCO NON-USER: CPT | Performed by: PHYSICIAN ASSISTANT

## 2021-05-12 PROCEDURE — 3008F BODY MASS INDEX DOCD: CPT | Performed by: PHYSICIAN ASSISTANT

## 2021-05-12 RX ORDER — LORATADINE 10 MG/1
10 TABLET ORAL DAILY
COMMUNITY

## 2021-05-12 RX ORDER — RIZATRIPTAN BENZOATE 10 MG/1
10 TABLET ORAL AS NEEDED
Qty: 9 TABLET | Refills: 0 | Status: SHIPPED | OUTPATIENT
Start: 2021-05-12 | End: 2021-08-03 | Stop reason: SDUPTHER

## 2021-05-12 NOTE — PATIENT INSTRUCTIONS
Preventive therapy:  -continue venlafaxine 75 mg in a m  daily  Emgality 2 injection first month then 1 injection every 30 days    Abortive therapy for migraine  At onset of migraine, take Rizatriptan  May repeat in 2 hours if needed   Limit of 3 a week or 9 a month  May take Excedrin, Aleve or Motrin but less than 3 times a week
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
## 2021-05-14 ENCOUNTER — TELEPHONE (OUTPATIENT)
Dept: NEUROLOGY | Facility: CLINIC | Age: 46
End: 2021-05-14

## 2021-05-14 NOTE — TELEPHONE ENCOUNTER
Emgality PA completed on [facility]  Key: [id number]    Received immediate approval on [facility]  2 pens  CaseId:[id number];Status:Approved; Review Type:Qty;Coverage Start Date:04/14/2021; Coverage End Date:06/13/2021    1 pen  CaseId:[id number];Status:Approved; Review Type:Prior Auth; Coverage Start Date:04/14/2021; Coverage End Date:05/14/2022;;    Left message for pharm making them aware of approval and that this is a new start and pt will need 2 pens for loading dose never

## 2021-06-15 DIAGNOSIS — G43.009 MIGRAINE WITHOUT AURA AND WITHOUT STATUS MIGRAINOSUS, NOT INTRACTABLE: ICD-10-CM

## 2021-06-15 NOTE — TELEPHONE ENCOUNTER
Due to lower cost, patient is requesting we send script for 90 day supply [facility] ([facility])  Thank you

## 2021-08-03 ENCOUNTER — TELEPHONE (OUTPATIENT)
Dept: NEUROLOGY | Facility: CLINIC | Age: 46
End: 2021-08-03

## 2021-08-03 DIAGNOSIS — G43.009 MIGRAINE WITHOUT AURA AND WITHOUT STATUS MIGRAINOSUS, NOT INTRACTABLE: ICD-10-CM

## 2021-08-03 RX ORDER — NAPROXEN 500 MG/1
TABLET ORAL
Qty: 45 TABLET | Refills: 0 | Status: SHIPPED | OUTPATIENT
Start: 2021-08-03 | End: 2021-12-21

## 2021-08-03 RX ORDER — RIZATRIPTAN BENZOATE 10 MG/1
10 TABLET ORAL AS NEEDED
Qty: 27 TABLET | Refills: 0 | Status: SHIPPED | OUTPATIENT
Start: 2021-08-03 | End: 2021-12-21

## 2021-08-03 NOTE — TELEPHONE ENCOUNTER
Patient of Eduardo Eagle, last office visit May 12  Patient called to request [facility] for  [facility])    rizatriptan    naproxen 500 mg one in am during her menstrual cycle    She is due for refill if in agreement; she said 30 day or 90 day supply is fine      [facility]

## 2021-11-16 ENCOUNTER — TELEPHONE (OUTPATIENT)
Dept: NEUROLOGY | Facility: CLINIC | Age: 46
End: 2021-11-16

## 2021-11-16 ENCOUNTER — OFFICE VISIT (OUTPATIENT)
Dept: NEUROLOGY | Facility: CLINIC | Age: 46
End: 2021-11-16
Payer: COMMERCIAL

## 2021-11-16 VITALS
SYSTOLIC BLOOD PRESSURE: 139 MMHG | DIASTOLIC BLOOD PRESSURE: 85 MMHG | HEIGHT: 60 IN | WEIGHT: 159 LBS | BODY MASS INDEX: 31.22 KG/M2 | HEART RATE: 97 BPM

## 2021-11-16 DIAGNOSIS — G43.009 MIGRAINE WITHOUT AURA AND WITHOUT STATUS MIGRAINOSUS, NOT INTRACTABLE: Primary | ICD-10-CM

## 2021-11-16 PROCEDURE — 99214 OFFICE O/P EST MOD 30 MIN: CPT | Performed by: PHYSICIAN ASSISTANT

## 2021-11-16 PROCEDURE — 1036F TOBACCO NON-USER: CPT | Performed by: PHYSICIAN ASSISTANT

## 2021-11-16 PROCEDURE — 3008F BODY MASS INDEX DOCD: CPT | Performed by: PHYSICIAN ASSISTANT

## 2021-11-16 RX ORDER — VENLAFAXINE HYDROCHLORIDE 150 MG/1
150 CAPSULE, EXTENDED RELEASE ORAL DAILY
Qty: 90 CAPSULE | Refills: 3 | Status: SHIPPED | OUTPATIENT
Start: 2021-11-16

## 2021-11-16 RX ORDER — RIMEGEPANT SULFATE 75 MG/75MG
75 TABLET, ORALLY DISINTEGRATING ORAL AS NEEDED
Qty: 8 TABLET | Refills: 3 | Status: SHIPPED | OUTPATIENT
Start: 2021-11-16

## 2021-12-20 DIAGNOSIS — G43.009 MIGRAINE WITHOUT AURA AND WITHOUT STATUS MIGRAINOSUS, NOT INTRACTABLE: ICD-10-CM

## 2021-12-21 RX ORDER — NAPROXEN 500 MG/1
TABLET ORAL
Qty: 45 TABLET | Refills: 3 | Status: SHIPPED | OUTPATIENT
Start: 2021-12-21

## 2021-12-21 RX ORDER — RIZATRIPTAN BENZOATE 10 MG/1
TABLET ORAL
Qty: 27 TABLET | Refills: 3 | Status: SHIPPED | OUTPATIENT
Start: 2021-12-21

## 2021-12-27 DIAGNOSIS — G43.009 MIGRAINE WITHOUT AURA AND WITHOUT STATUS MIGRAINOSUS, NOT INTRACTABLE: Primary | ICD-10-CM

## 2022-02-28 RX ORDER — NAPROXEN 500 MG/1
1 TABLET ORAL DAILY
COMMUNITY
Start: 2021-12-21

## 2022-02-28 RX ORDER — VENLAFAXINE HYDROCHLORIDE 75 MG/1
150 CAPSULE, EXTENDED RELEASE ORAL DAILY
COMMUNITY
Start: 2021-11-02 | End: 2023-09-19 | Stop reason: SDUPTHER

## 2022-02-28 RX ORDER — RIZATRIPTAN BENZOATE 10 MG/1
1 TABLET ORAL AS NEEDED
COMMUNITY
Start: 2021-08-03 | End: 2023-09-19 | Stop reason: SDUPTHER

## 2022-02-28 RX ORDER — LORATADINE 10 MG/1
10 TABLET ORAL DAILY
COMMUNITY

## 2022-02-28 RX ORDER — RIMEGEPANT SULFATE 75 MG/75MG
75 TABLET, ORALLY DISINTEGRATING ORAL AS NEEDED
COMMUNITY
Start: 2021-11-16 | End: 2023-09-19 | Stop reason: SDUPTHER

## 2022-02-28 NOTE — PATIENT INSTRUCTIONS
Please message us via My Chart with any questions or concerns    Cervicalgia  Basic neck exercises for daily use:    - Neck pathology and poor posture, with straightening of the normal cervical lordosis, can cause headaches.  Tightening of the neck muscles can irritate the nerves in the occipital region of the head and cause or worsen head pain. Thus neck strengthening and relaxation exercises, can help improve this particular pain. It is importance to have good posture for improving shoulder, neck, and head pain.    - Here are some exercises which should take 5 minutes:     1. Standing, drop your head to one side while continuing to look ahead. Hold for 10 seconds then swap sides. Repeat twice more each side. To increase the stretch, drop the opposite shoulder.    2. Standing again, lower your chin to your chest, hold for 10 and then look up to the ceiling and hold for 10. Repeat twice more.     3. Next, standing straight again, look over your right shoulder and hold firm for 10 seconds, then over your left shoulder for 10. Repeat this 3 times.     4. Finally, while sitting upright, bring your head forward and hold for 10, then all the way back and hold for 10.    If this simple exercise does not help improve the posture, we will consider formal physical therapy in the future.       Importance of Healthy Sleep:  Behavioral sleep changes can promote restful, regular sleep and reduce headache. Simple changes like establishing consistent sleep and wake-up times, as well as getting between 7 and 8 hours of sleep a day, can make a world of difference. Experts also recommend avoiding substances that impair sleep, like caffeine, nicotine and alcohol, and also suggest winding down before bed to prevent sleep problems. To read more go to https://americanmigrainefoundation.org/resource-library/sleep/    Medication overuse headaches:   - Medication overuse headache (MOH) and analgesic overuse can negate the effectiveness of  "headache preventive measures.  Avoid medications with narcotics, barbiturates, or caffeine in them as these can cause rebound headaches after very few doses and can interfere with other headache medicine efficacy. Taking  any acute/abortive over the counter medication or prescription drugs for more than 2-3 days a week can cause medication overuse headache.   Chronic migraine headache  Preventive therapy for headaches:   -Over-the-counter supplements: to decrease intensity and frequency of migraines  - Magnesium Oxide 400mg one at bedtime.  If any diarrhea or upset stomach, decrease dose  as tolerated  - Vitamin B2 200 mg twice a day. May cause the urine to turn yellow which is normal for B 2 to do and is not a sign that you are dehydrated. May order on line, as [employer] does have 400 mg capsules.  -We will have patient stop Emgality  We will submit for Botox to help with chronic migraine headache and neck pain  Abortive therapy for headaches:   - At the onset of headache: No tach during her menstrual cycle her either daily or every other day to help decrease intensity and frequency.  -Patient does need to decrease the use of Advil which she is doing daily.  As this most likely is driving her headaches.      Headache management instructions  - When patient has a moderate to severe headache, they should seek rest, initiate relaxation and apply cold compresses to the head.   - Maintain regular sleep schedule. Adults need at least 7-8 hours of uninterrupted sleep, per night.   - Limit over the counter medications such as Tylenol, Ibuprofen, Aleve, Excedrin. (No more than 2- 3 times a week or max 10 a month).  - Maintain headache diary.  Free CECILE for a smart phone, which can be used is \"Migraine marixa\"  - Limit caffeine to 1-2 cups, 8 to 16 oz a day or less.  - Avoid dietary trigger. (aged cheese, peanuts, MSG, aspartame and nitrates).  - Patient is to have regular frequent meals to prevent headache onset.    - " Please drink at least 64 ounces of water a day, to help remain hydrated.    Cognitive behavioral therapy (CBT) is a common type of talk therapy (psychotherapy) were you work with a psychotherapist or therapist . CBT helps you become aware of inaccurate or negative thinking so you can view challenging situations more clearly and respond to them in a more effective way. CBT can be a very helpful tool ? either alone or in combination with other therapies ? in treating mental health disorders (depression, post-traumatic stress disorder (PTSD) or an eating disorder) and chronic pain. CBT can be an effective tool to help anyone learn how to better manage stressful life situations and pain. In some cases, CBT is most effective when it's combined with other treatments, such as antidepressants or other medications.  You can start yourself by down loading the héctor: [employer]      Mindfulness/Meditation for Treating Migraine  Many people believe that stress is a major trigger for their migraine. This is where mindfulness and meditation can come into play, as they have been known to help reduce migraine severity, duration and acute pain medication use. It may also help to relieve stress and anxiety while improving feelings of well being.    Biofeedback involves becoming more aware of the changes that occur in the body and learning how to exert control over generally involuntary functions. Biofeedback allows you to see your vitals in real-time and learn how to stabilize them on your own. There is great evidence that biofeedback can reduce the frequency, intensity, and duration of migraine and tension-type headache.  When you're stressed, you may notice elevated heart rates, tightened muscles, and sweating.  During biofeedback, you can see these changes on a monitor, then a therapist teaches you exercises to help manage these changes.    Yoga/Franklyn Chi for Treating Migraine  The kind of mind/body therapy that yoga can provide may help  create relief from migraine. Keeping up with yoga consistently can reduce headache frequency, intensity and duration, so it's important to practice regularly if you plan to use it as a complementary migraine treatment. However, certain types of yoga such as “ hot yoga”   may be uncomfortable for people with migraine. Others, such as “ restorative yoga,” may be tolerable even for a patient with chronic migraine.  Franklyn Chi can also have a similar benefit for patients with migraine. Specifically, it can help improve balance, which can be very useful for those with vestibular symptoms or vestibular migraine.    Acupuncture for Treating Migraine  A traditional Chinese medicine, acupuncture is reported to increase the release of serotonin, dopamine and other chemicals that may help to treat chronic pain, and can be helpful in preventing episodic migraine. There are, however, conflicting results on studies in acupuncture as a treatment for migraine.    Exercising for migraineurs:  Regular exercise can reduce the frequency and intensity of headaches and migraines. When one exercises, the body releases endorphins, which are the body's natural painkillers. Exercise reduces stress and helps individuals to sleep at night. Exercising at least 30 to 40 minutes 3 times a week is sufficient for most patients.   When exercising, follow this plan to prevent headaches:  - First, stay hydrated before, during, and after exercise.    - Second part of the exercise plan is to eat sufficient food about an hour and a half before you exercise. Exercise causes one's blood sugar level to decrease, and it is important to have a source of energy.   - Final part of the exercise plan is to warm-up. Do not jump into sudden, vigorous exercise if that triggers a headache or migraine.   To read more go to https://americanmigrainefoundation.org/resource-library/effects-of-exercise-on-headaches-and-migraines/

## 2022-02-28 NOTE — PROGRESS NOTES
[facility]  Yolanda Nassar MD  [address] ([address])  [place] [address]       Patient ID: Blanca Chávez    : 1975  MRN: [id number]                                            Visit Date: 3/1/2022  Encounter Provider: Yolanda Nassar  Referring Provider: Self-Referred           Assessment/Plan   Problem List Items Addressed This Visit        Nervous    Cervicalgia    Headache, chronic migraine without aura - Primary    Relevant Medications    galcanezumab-gnlm (EMGALITY) 120 mg/mL pen injector subcutaneous pen    naproxen (NAPROSYN) 500 mg tablet    rimegepant (NURTEC ODT) 75 mg tablet,disintegrating    rizatriptan (MAXALT) 10 mg tablet    venlafaxine XR (EFFEXOR-XR) 75 mg 24 hr capsule    Other Relevant Orders    Iron and TIBC       Other    Cognitive impairment      Other Visit Diagnoses     Vitamin D deficiency        Relevant Orders    Vitamin D 25 hydroxy    Vitamin B12 deficiency        Relevant Orders    Vitamin B12          Please message us via My Chart with any questions or concerns    Cervicalgia  Basic neck exercises for daily use:    - Neck pathology and poor posture, with straightening of the normal cervical lordosis, can cause headaches.  Tightening of the neck muscles can irritate the nerves in the occipital region of the head and cause or worsen head pain. Thus neck strengthening and relaxation exercises, can help improve this particular pain. It is importance to have good posture for improving shoulder, neck, and head pain.    - Here are some exercises which should take 5 minutes:     1. Standing, drop your head to one side while continuing to look ahead. Hold for 10 seconds then swap sides. Repeat twice more each side. To increase the stretch, drop the opposite shoulder.    2. Standing again, lower your chin to your chest, hold for 10 and then look up to the ceiling and hold for 10. Repeat twice more.     3. Next, standing straight  again, look over your right shoulder and hold firm for 10 seconds, then over your left shoulder for 10. Repeat this 3 times.     4. Finally, while sitting upright, bring your head forward and hold for 10, then all the way back and hold for 10.    If this simple exercise does not help improve the posture, we will consider formal physical therapy in the future.       Importance of Healthy Sleep:  Behavioral sleep changes can promote restful, regular sleep and reduce headache. Simple changes like establishing consistent sleep and wake-up times, as well as getting between 7 and 8 hours of sleep a day, can make a world of difference. Experts also recommend avoiding substances that impair sleep, like caffeine, nicotine and alcohol, and also suggest winding down before bed to prevent sleep problems. To read more go to https://americanmigrainefoundation.org/resource-library/sleep/    Medication overuse headaches:   - Medication overuse headache (MOH) and analgesic overuse can negate the effectiveness of headache preventive measures.  Avoid medications with narcotics, barbiturates, or caffeine in them as these can cause rebound headaches after very few doses and can interfere with other headache medicine efficacy. Taking  any acute/abortive over the counter medication or prescription drugs for more than 2-3 days a week can cause medication overuse headache.   Chronic migraine headache  Preventive therapy for headaches:   -Over-the-counter supplements: to decrease intensity and frequency of migraines  - Magnesium Oxide 400mg one at bedtime.  If any diarrhea or upset stomach, decrease dose  as tolerated  - Vitamin B2 200 mg twice a day. May cause the urine to turn yellow which is normal for B 2 to do and is not a sign that you are dehydrated. May order on line, as [employer] does have 400 mg capsules.  -We will have patient stop Emgality  We will submit for Botox to help with chronic migraine headache and neck  "pain  Abortive therapy for headaches:   - At the onset of headache: No tach during her menstrual cycle her either daily or every other day to help decrease intensity and frequency.  -Patient does need to decrease the use of Advil which she is doing daily.  As this most likely is driving her headaches.    Headache management instructions  - When patient has a moderate to severe headache, they should seek rest, initiate relaxation and apply cold compresses to the head.   - Maintain regular sleep schedule. Adults need at least 7-8 hours of uninterrupted sleep, per night.   - Limit over the counter medications such as Tylenol, Ibuprofen, Aleve, Excedrin. (No more than 2- 3 times a week or max 10 a month).  - Maintain headache diary.  Free CECILE for a smart phone, which can be used is \"Migraine marixa\"  - Limit caffeine to 1-2 cups, 8 to 16 oz a day or less.  - Avoid dietary trigger. (aged cheese, peanuts, MSG, aspartame and nitrates).  - Patient is to have regular frequent meals to prevent headache onset.    - Please drink at least 64 ounces of water a day, to help remain hydrated.      Return in about 3 months (around 6/1/2022).         _________________________________________________________________      Chief Complaint: Migraine (chronic migraines HX. since last seen, lot of stress in life with  TBI. memory brings anger with . headaches, needs med review.  [facility] pt of Dr. Nassar. MRI brain 03/2020)      Subjective     We had the pleasure of evaluating Blanca Chávez in neurological consultation today. As you know she  is a 46 y.o.  years old  right handed female.  she is here today for evaluation of migraine headache.  What kind of work do you do?  .  Patient is  with 3 children    Medical history review:   QTC: Last EKG done 6/19/2021 at [facility]  Tobacco use: Never used  Alcohol use: Socially  Illicit drug use: Never used  Daily Caffeine intake? Coffee -none    , Tea -   " 6 ounces, Soda -none  Daily water intake?  64 oz  GERD  Does recall having 3 head injuries.  Last head injury was an 2016.She slipped on ice and hit her head. Does not recall having headaches after that.     Psychiatry history review:  Panic attack  Adjustment disorder   Depression: in the past  Anxiety: Yes  Seeing a psychiatrist/ How often? Will be seeing them on Monday  Seeing at therapist/ how often? Yes, once a month    Cognitive impairment:  Patient states that she has noticed problem with concentration and word finding difficulty.  Has to write everything down.  Does have a lot of stress in her life.  Not sure if this is what playing a role and her memory.    Headache history:  Any family history of migraines?  Patient's mother and sister  Any family history of aneurysms? none  Family history of any other neurological disease?  Paternal grandfather with stroke    Have you seen someone else for headaches or pain?  Yes myself at [facility] last seen by myself was in 2020.    Headaches started at what age?  Age of 40    What is your current pain level?  2/10    How often do the headaches/pain occur?  More than 15 headaches per month  Mild headaches: 4 - 5 headaches a week  Moderate to severe headaches: 2 a week-last time she had a migraine was while ago and does not recall  2019:  Summer was good.  No migraines  However since school started began having migraines again and had leaking air conditioner.  Discovered black mold and thinks it has been treated.  Had been taking 2 aleve since school started and now is fine.    Works 90% of the time. her headaches that get up to 9/10 comes on suddenly and gets worse quickly.   2020:    Feb: 8- trigger weather and menstruation  March: 6 headaches - she had dizziness with this and states she had nausea and felt as if her worlds was spinning. She did have 5 days of dizziness with no headaches she states she has the feeling as if the world was moving and at time has problem  with her balance.   April: 13 headaches, She thinks that during this month that was secondary to weather and allergies.  May: she is taking zyrtec and aleve am daily for the last 14 days. Thinks are more sinus type headaches.    Are you ever headache free? 2-3    Aura/Warning and how long does it last?  None       What time of the day do headaches/pain start?  Mild headaches: afternoon   Moderate to severe headaches: can wakes up with them, or the mild may progress into an intense headache    How long do the headaches/pain last?  More than 4 hours/day  Mild headaches: with the use of Advil it bring it down, is using this almost daily  Moderate to severe headaches: taking Maxalt does not help with migraine    Describe your usual headache/pain?   Mild headaches: aching  Moderate to severe headaches: throbbing, stabbing    Where is your headache/pain located?   Mild headaches: frontal  Moderate to severe headaches: frontal and radiates to the apex of her head    What is the intensity of pain?   Mild headaches: 4/10  Moderate to severe headaches: 7/10    Associated symptoms:   - Decrease appetite,   - Photophobia - mild   - Insomnia   - Stiff or sore neck   - Dizziness, light headed, Off balance sensation  - Problems with concentration   - Blurred vision - there all the time  - Prefer to be in a cool, quiet, dark room     Number of days missed per month because of headaches/pain:   Work (or school) days: works through this  Social or Family activities: 3 a month    Headache are worse if the patient: exertion   Headache triggers: Stress, tension, menstruation, weather change  What time of the year do headaches occur more frequently?     Sleep Habit:   How many hours do you actually sleep?  To 6 hours asleep  Do you snore and or have been told that you stop breathing during sleeping?  Yes  Do you grind/clench your teeth at night?  Yes  Do you have jaw pain?  Yes    Are you currently pregnant or planning on getting  pregnant?  No  Are you on a birth control pill?     What treatment have you had in the past or currently using for headaches/pain/mood?   Trigger point injection/Nerve block performed and how often? NO   Epidural injections or trans foraminal injections performed? YES child birth  Alternative therapies?  chiropractic therapy  CBD or THC for your headaches and how often? YES did not help  Other headache devices? NO   Botulinum toxin injection performed and how often? NO   Headache infusions:NO   Preventive medication therapy:   -Magnesium 400 mg, vitamin B2 200 mg twice a day  -Emgality 120 mg injection  -Venlafaxine 150 mg,  -Xanax 0.25 mg,  -Lamictal 25 mg, Depakote 500 mg,  -Propanolol, verapamil,  Abortive medication Therapy:   -Fioricet,  -Excedrin Migraine,  -Aleve 220 mg,  -Tylenol 500 mg,  -Sumatriptan 100 mg, rizatriptan 10 mg,  -Nurtec 75 mg  -Compazine 10 mg,  -Methylprednisolone 4 mg,      Have you ever had any Brain imaging?  Yes  - I personally reviewed old notes from her PCP over the last few months     03/10/2020-MRI of the brain with without contrast:  FINDINGS:   BRAIN PARENCHYMA: There is no discrete mass, mass effect or midline shift. There is no intracranial hemorrhage. Normal posterior fossa. Diffusion imaging is unremarkable.    There are no white matter changes in the cerebral hemispheres.   Postcontrast imaging of the brain demonstrates no abnormal enhancement.   VENTRICLES: Normal for the patient's age.   SELLA AND PITUITARY GLAND: Normal.   ORBITS: Normal.   PARANASAL SINUSES: Normal.   VASCULATURE: Evaluation of the major intracranial vasculature demonstrates appropriate flow voids.   CALVARIUM AND SKULL BASE: Normal.   EXTRACRANIAL SOFT TISSUES: Normal.   IMPRESSION:   Normal examination.          Medications:   Current Outpatient Medications:   •  galcanezumab-gnlm (EMGALITY) 120 mg/mL pen injector subcutaneous pen, Inject 1 mL under the skin every 30 (thirty) days., Disp: , Rfl:   •   loratadine (CLARITIN) 10 mg tablet, Take 10 mg by mouth daily., Disp: , Rfl:   •  naproxen (NAPROSYN) 500 mg tablet, Take 1 tablet by mouth daily., Disp: , Rfl:   •  rizatriptan (MAXALT) 10 mg tablet, Take 1 tablet by mouth as needed., Disp: , Rfl:   •  venlafaxine XR (EFFEXOR-XR) 75 mg 24 hr capsule, Take 150 mg by mouth daily.  , Disp: , Rfl:   •  rimegepant (NURTEC ODT) 75 mg tablet,disintegrating, Take 75 mg by mouth as needed., Disp: , Rfl:     Past Medical History:  has no past medical history on file.    Past Surgical History:  has no past surgical history on file.    Social History:  reports that she has never smoked. She does not have any smokeless tobacco history on file. No history on file for alcohol use and drug use.    Family History: family history is not on file.    Allergies: is allergic to bee pollen and venom-honey bee.     Review of Systems  All other systems reviewed and negative except as noted in the HPI.      The following have been reviewed and updated as appropriate in this visit:   Allergies  Meds  Problems         Objective   Physical Exam:    Visit Vitals  /78   Pulse 86   Temp 36.6 °C (97.9 °F) (Oral)   Resp 20   SpO2 99%         Musculoskeletal: - No injury or deformity  - Range of motion WNL     Behavior/Emotional: Appropriate, cooperative     Neurologic Exam:  Alert and oriented.       CN: intact    Motor: moving all extremities.    Sensory examination: was normal to  light touch    Coordination: No tremor noted    Reflexes: 2/4 throughout.       Gait: Was narrow based.             Yolanda Nassar MD    I spent 70 minutes on this date of service performing the following activities: obtaining history, performing examination, entering orders, documenting, preparing for visit, obtaining / reviewing records, providing counseling and education and independently reviewing study/studies.

## 2022-03-01 ENCOUNTER — OFFICE VISIT (OUTPATIENT)
Dept: NEUROLOGY | Facility: CLINIC | Age: 47
End: 2022-03-01
Payer: COMMERCIAL

## 2022-03-01 VITALS
DIASTOLIC BLOOD PRESSURE: 78 MMHG | SYSTOLIC BLOOD PRESSURE: 124 MMHG | TEMPERATURE: 97.9 F | HEART RATE: 86 BPM | OXYGEN SATURATION: 99 % | RESPIRATION RATE: 20 BRPM

## 2022-03-01 DIAGNOSIS — G43.709 CHRONIC MIGRAINE WITHOUT AURA WITHOUT STATUS MIGRAINOSUS, NOT INTRACTABLE: Primary | ICD-10-CM

## 2022-03-01 DIAGNOSIS — E55.9 VITAMIN D DEFICIENCY: ICD-10-CM

## 2022-03-01 DIAGNOSIS — R41.89 COGNITIVE IMPAIRMENT: ICD-10-CM

## 2022-03-01 DIAGNOSIS — M54.2 CERVICALGIA: ICD-10-CM

## 2022-03-01 DIAGNOSIS — E53.8 VITAMIN B12 DEFICIENCY: ICD-10-CM

## 2022-03-01 PROCEDURE — 99215 OFFICE O/P EST HI 40 MIN: CPT | Performed by: PSYCHIATRY & NEUROLOGY

## 2022-03-03 ENCOUNTER — TELEPHONE (OUTPATIENT)
Dept: NEUROLOGY | Facility: CLINIC | Age: 47
End: 2022-03-03
Payer: COMMERCIAL

## 2022-03-03 NOTE — TELEPHONE ENCOUNTER
Please call patient and make an appointment.    ----- Message from Carrie Smith sent at 3/2/2022  3:33 PM EST -----  Regarding: RE: Botox 200 units for patient's chronic migraine headache  Botox approved 3/2/22-3/1/2023 800 units  Reference # [id number], [id number]  Buy & bill ok  She can be scheduled whenever. Thank you!  ----- Message -----  From: Carrie Smith  Sent: 3/2/2022   9:38 AM EST  To: Yolanda Nassar MD, Kerry Mcleod, #  Subject: RE: Botox 200 units for patient's chronic mi#    Faxed PA request to [facility] at [phone]  Not covered under patient's pharmacy benefits, [employer]  Thank you!  ----- Message -----  From: Yolanda Nassar MD  Sent: 3/1/2022   4:20 PM EST  To: Carrie Luciano, #  Subject: Botox 200 units for patient's chronic migrai#    Need Botox 200 units for patient's chronic migraine headache.  Thank you

## 2022-03-03 NOTE — TELEPHONE ENCOUNTER
Called patient and regarding Botox being approved left detailed voicemail for patient to call back in to get scheduled

## 2022-03-08 LAB
25(OH)D3+25(OH)D2 SERPL-MCNC: 15 NG/ML (ref 30–100)
IRON SATN MFR SERPL: 9 % (ref 15–55)
IRON SERPL-MCNC: 33 UG/DL (ref 27–159)
TIBC SERPL-MCNC: 354 UG/DL (ref 250–450)
UIBC SERPL-MCNC: 321 UG/DL (ref 131–425)
VIT B12 SERPL-MCNC: 284 PG/ML (ref 232–1245)

## 2022-03-10 ENCOUNTER — OFFICE VISIT (OUTPATIENT)
Dept: NEUROLOGY | Facility: CLINIC | Age: 47
End: 2022-03-10
Payer: COMMERCIAL

## 2022-03-10 VITALS
SYSTOLIC BLOOD PRESSURE: 124 MMHG | DIASTOLIC BLOOD PRESSURE: 78 MMHG | HEART RATE: 110 BPM | RESPIRATION RATE: 20 BRPM | TEMPERATURE: 97.4 F | OXYGEN SATURATION: 99 %

## 2022-03-10 DIAGNOSIS — R41.89 COGNITIVE IMPAIRMENT: ICD-10-CM

## 2022-03-10 DIAGNOSIS — G43.709 CHRONIC MIGRAINE WITHOUT AURA WITHOUT STATUS MIGRAINOSUS, NOT INTRACTABLE: Primary | ICD-10-CM

## 2022-03-10 DIAGNOSIS — E53.8 VITAMIN B12 DEFICIENCY: ICD-10-CM

## 2022-03-10 DIAGNOSIS — E55.9 VITAMIN D DEFICIENCY: ICD-10-CM

## 2022-03-10 PROCEDURE — 99999 PR OFFICE/OUTPT VISIT,PROCEDURE ONLY: CPT | Performed by: PSYCHIATRY & NEUROLOGY

## 2022-03-10 PROCEDURE — 64615 CHEMODENERV MUSC MIGRAINE: CPT | Performed by: PSYCHIATRY & NEUROLOGY

## 2022-03-10 RX ORDER — VIT C/E/ZN/COPPR/LUTEIN/ZEAXAN 250MG-90MG
CAPSULE ORAL
Qty: 30 EACH | Refills: 6 | Status: SHIPPED | OUTPATIENT
Start: 2022-03-10 | End: 2024-04-03

## 2022-03-10 RX ORDER — ERGOCALCIFEROL 1.25 MG/1
50000 CAPSULE ORAL WEEKLY
Qty: 4 CAPSULE | Refills: 6 | Status: SHIPPED | OUTPATIENT
Start: 2022-03-10 | End: 2024-04-03

## 2022-03-10 NOTE — PROGRESS NOTES
Injection Procedures    Date/Time: 3/10/2022 9:40 AM  Performed by: Yolanda Nassar MD  Authorized by: Yolanda Nassar MD     Consent:     Consent obtained:  Written    Consent given by:  Patient       [facility]  Yolanda Nassar MD  [address] [place] [address] ([address])  [place] [address]       Indication: Chronic migraine headache     Procedure details:     Position:  Upright    Botulinum toxin:    Botox Type:  Type A    Brand:  Botox      Procedures:          L  injection amount:  5 units     R  injection amount:  5 units    Procerus injection amount:  5 units      L lateral frontalis:  5 units     R lateral frontalis:  5 units     L medial frontalis:  5 units     R medial frontalis:  5 units      L temporalis injection amount:  20 units     R temporalis injection amount:  20 units      L medial occipitalis injection amount:  15 units     R medial occipitalis injection amount:  15 units      L superior cervical paraspinal injection amount:  10 units     R superior cervical paraspinal injection amount:  10 units      L superior trapezius injection amount:  15 units     R superior trapezius injection amount:  15 units    Total Units:     Total units used:  155 units    Total units discarded:  45 units    Post-procedure details:     Chemodenervation:  Chronic migraine headaches    Patient tolerance of procedure:  Tolerated well, no immediate complications

## 2022-03-12 LAB
ERYTHROCYTE [DISTWIDTH] IN BLOOD BY AUTOMATED COUNT: 15.7 % (ref 11.7–15.4)
FERRITIN SERPL-MCNC: 8 NG/ML (ref 15–150)
HCT VFR BLD AUTO: 30.7 % (ref 34–46.6)
HGB BLD-MCNC: 9.8 G/DL (ref 11.1–15.9)
MCH RBC QN AUTO: 24.8 PG (ref 26.6–33)
MCHC RBC AUTO-ENTMCNC: 31.9 G/DL (ref 31.5–35.7)
MCV RBC AUTO: 78 FL (ref 79–97)
PLATELET # BLD AUTO: 256 X10E3/UL (ref 150–450)
RBC # BLD AUTO: 3.95 X10E6/UL (ref 3.77–5.28)
WBC # BLD AUTO: 7.3 X10E3/UL (ref 3.4–10.8)

## 2022-04-20 ENCOUNTER — TELEPHONE (OUTPATIENT)
Dept: NEUROLOGY | Facility: CLINIC | Age: 47
End: 2022-04-20

## 2022-04-20 PROCEDURE — 88313 SPECIAL STAINS GROUP 2: CPT | Performed by: PATHOLOGY

## 2022-04-20 PROCEDURE — 88305 TISSUE EXAM BY PATHOLOGIST: CPT | Performed by: PATHOLOGY

## 2022-04-20 NOTE — TELEPHONE ENCOUNTER
[facility] left a message for Dr Tra Tao to sent script for Vit D 50,000 units, 90 day supply  [phone]  Called [facility], spoke w/ Felicita Garcia and advised that Dr Tra Tao no longer work for [facility]  Can call Dr Tra Toa at [phone]  I was transferred to [facility] and made aware of the above  He verbalized understanding  I was then transferred to another dept  On hold for >7 min  Then call dropped

## 2022-04-21 ENCOUNTER — LAB REQUISITION (OUTPATIENT)
Dept: LAB | Facility: HOSPITAL | Age: 47
End: 2022-04-21
Payer: COMMERCIAL

## 2022-04-21 DIAGNOSIS — D50.9 IRON DEFICIENCY ANEMIA, UNSPECIFIED: ICD-10-CM

## 2022-04-25 ENCOUNTER — TELEPHONE (OUTPATIENT)
Dept: NEUROLOGY | Facility: CLINIC | Age: 47
End: 2022-04-25

## 2022-04-25 NOTE — TELEPHONE ENCOUNTER
Patient has 11:30am video visit appt with Dr. Nassar today. I called to gather HX info and update info prior to visit. Left a message on her voicemail requesting she call me back ASAP. Left my name and # for her to call me back

## 2022-05-26 ENCOUNTER — OFFICE VISIT (OUTPATIENT)
Dept: NEUROLOGY | Facility: CLINIC | Age: 47
End: 2022-05-26
Payer: COMMERCIAL

## 2022-05-26 VITALS
TEMPERATURE: 98.4 F | HEART RATE: 78 BPM | RESPIRATION RATE: 20 BRPM | SYSTOLIC BLOOD PRESSURE: 118 MMHG | OXYGEN SATURATION: 99 % | DIASTOLIC BLOOD PRESSURE: 74 MMHG

## 2022-05-26 DIAGNOSIS — G43.709 CHRONIC MIGRAINE WITHOUT AURA WITHOUT STATUS MIGRAINOSUS, NOT INTRACTABLE: Primary | ICD-10-CM

## 2022-05-26 PROCEDURE — 99999 PR OFFICE/OUTPT VISIT,PROCEDURE ONLY: CPT | Performed by: PSYCHIATRY & NEUROLOGY

## 2022-05-26 PROCEDURE — 64615 CHEMODENERV MUSC MIGRAINE: CPT | Performed by: PSYCHIATRY & NEUROLOGY

## 2022-05-26 NOTE — PROGRESS NOTES
Injection Procedures    Date/Time: 5/26/2022 2:30 PM  Performed by: Yolanda Nassar MD  Authorized by: Yolanda Nassar MD     Consent:     Consent obtained:  Written    Consent given by:  Patient       [facility]  Yolanda Nassar MD  [address] [place] [address] ([address])  [place] Garcia [id number]       Indication: Chronic migraine headache     Procedure details:     Position:  Upright    Botulinum toxin:    Botox Type:  Type A    Brand:  Botox      Procedures:          L  injection amount:  5 units     R  injection amount:  5 units    Procerus injection amount:  5 units      L lateral frontalis:  5 units     R lateral frontalis:  5 units     L medial frontalis:  5 units     R medial frontalis:  5 units      L temporalis injection amount:  20 units     R temporalis injection amount:  20 units      L medial occipitalis injection amount:  15 units     R medial occipitalis injection amount:  15 units      L superior cervical paraspinal injection amount:  10 units     R superior cervical paraspinal injection amount:  10 units      L superior trapezius injection amount:  15 units     R superior trapezius injection amount:  15 units    Total Units:     Total units used:  155 units    Total units discarded:  45 units    Post-procedure details:     Chemodenervation:  Chronic migraine headaches    Patient tolerance of procedure:  Tolerated well, no immediate complications

## 2022-07-14 RX ORDER — DEXAMETHASONE 1 MG/1
TABLET ORAL
Qty: 10 TABLET | Refills: 1 | Status: SHIPPED | OUTPATIENT
Start: 2022-07-14 | End: 2022-07-14 | Stop reason: SDUPTHER

## 2022-07-26 ENCOUNTER — TELEPHONE (OUTPATIENT)
Dept: NEUROLOGY | Facility: CLINIC | Age: 47
End: 2022-07-26

## 2022-07-26 NOTE — TELEPHONE ENCOUNTER
Received Subpoena for Medical Records from [place]  Request scanned into  and faxed to [phone]

## 2022-08-02 NOTE — TELEPHONE ENCOUNTER
Received second legal request for medical records from [facility]  Request scanned into [facility] [employer] and faxed to [facility]

## 2022-08-18 ENCOUNTER — TELEPHONE (OUTPATIENT)
Dept: NEUROLOGY | Facility: CLINIC | Age: 47
End: 2022-08-18

## 2022-10-11 ENCOUNTER — OFFICE VISIT (OUTPATIENT)
Dept: NEUROLOGY | Facility: CLINIC | Age: 47
End: 2022-10-11
Payer: COMMERCIAL

## 2022-10-11 VITALS — SYSTOLIC BLOOD PRESSURE: 108 MMHG | TEMPERATURE: 97.6 F | DIASTOLIC BLOOD PRESSURE: 76 MMHG

## 2022-10-11 DIAGNOSIS — G43.709 CHRONIC MIGRAINE WITHOUT AURA WITHOUT STATUS MIGRAINOSUS, NOT INTRACTABLE: Primary | ICD-10-CM

## 2022-10-11 PROCEDURE — 99999 PR OFFICE/OUTPT VISIT,PROCEDURE ONLY: CPT | Performed by: PSYCHIATRY & NEUROLOGY

## 2022-10-11 PROCEDURE — 64615 CHEMODENERV MUSC MIGRAINE: CPT | Performed by: PSYCHIATRY & NEUROLOGY

## 2022-10-11 PROCEDURE — 200200 PR NO CHARGE: Performed by: PSYCHIATRY & NEUROLOGY

## 2022-10-11 RX ORDER — CLONIDINE HYDROCHLORIDE 0.1 MG/1
0.1 TABLET ORAL
COMMUNITY

## 2022-10-11 RX ORDER — TRANEXAMIC ACID 650 MG/1
TABLET ORAL
COMMUNITY
Start: 2022-04-28

## 2022-10-11 RX ORDER — PRAZOSIN HYDROCHLORIDE 1 MG/1
1 CAPSULE ORAL NIGHTLY
COMMUNITY
Start: 2022-08-03

## 2022-10-11 NOTE — PROGRESS NOTES
Injection Procedures    Date/Time: 10/11/2022 9:30 AM  Performed by: Yolanda Nassar MD  Authorized by: Yolanda Nassar MD     Consent:     Consent obtained:  Written    Consent given by:  Patient       [facility]  Yolanda Nassar MD  [address] [place] [address] ([address])  [place] [id number]       Indication: Chronic migraine headache     Procedure details:     Position:  Upright    Botulinum toxin:    Botox Type:  Type A    Brand:  Botox      Procedures:          L  injection amount:  5 units     R  injection amount:  5 units    Procerus injection amount:  5 units      L lateral frontalis:  5 units     R lateral frontalis:  5 units     L medial frontalis:  5 units     R medial frontalis:  5 units      L temporalis injection amount:  20 units     R temporalis injection amount:  20 units      L medial occipitalis injection amount:  15 units     R medial occipitalis injection amount:  15 units      L superior cervical paraspinal injection amount:  10 units     R superior cervical paraspinal injection amount:  10 units      L superior trapezius injection amount:  15 units     R superior trapezius injection amount:  15 units    Total Units:     Total units used:  155 units    Total units discarded:  45 units    Post-procedure details:     Chemodenervation:  Chronic migraine headaches    Patient tolerance of procedure:  Tolerated well, no immediate complications

## 2022-10-12 ENCOUNTER — OFFICE VISIT (OUTPATIENT)
Dept: FAMILY MEDICINE CLINIC | Facility: CLINIC | Age: 47
End: 2022-10-12
Payer: COMMERCIAL

## 2022-10-12 VITALS
SYSTOLIC BLOOD PRESSURE: 120 MMHG | HEART RATE: 87 BPM | BODY MASS INDEX: 31.53 KG/M2 | WEIGHT: 160.6 LBS | OXYGEN SATURATION: 99 % | RESPIRATION RATE: 18 BRPM | HEIGHT: 60 IN | DIASTOLIC BLOOD PRESSURE: 80 MMHG | TEMPERATURE: 97.5 F

## 2022-10-12 DIAGNOSIS — Z13.6 SCREENING FOR CARDIOVASCULAR CONDITION: ICD-10-CM

## 2022-10-12 DIAGNOSIS — D50.9 IRON DEFICIENCY ANEMIA, UNSPECIFIED IRON DEFICIENCY ANEMIA TYPE: ICD-10-CM

## 2022-10-12 DIAGNOSIS — Z00.00 ENCOUNTER FOR WELL ADULT EXAM WITHOUT ABNORMAL FINDINGS: Primary | ICD-10-CM

## 2022-10-12 DIAGNOSIS — R00.0 TACHYCARDIA: ICD-10-CM

## 2022-10-12 DIAGNOSIS — E55.9 VITAMIN D DEFICIENCY: ICD-10-CM

## 2022-10-12 PROBLEM — R41.89 COGNITIVE IMPAIRMENT: Status: ACTIVE | Noted: 2022-03-01

## 2022-10-12 PROBLEM — G43.709 HEADACHE, CHRONIC MIGRAINE WITHOUT AURA: Status: ACTIVE | Noted: 2022-03-01

## 2022-10-12 PROBLEM — F33.9 MAJOR DEPRESSIVE DISORDER, RECURRENT EPISODE WITH ANXIOUS DISTRESS (HCC): Status: ACTIVE | Noted: 2022-03-07

## 2022-10-12 PROBLEM — F43.10 PTSD (POST-TRAUMATIC STRESS DISORDER): Status: ACTIVE | Noted: 2022-03-07

## 2022-10-12 PROCEDURE — 99396 PREV VISIT EST AGE 40-64: CPT | Performed by: FAMILY MEDICINE

## 2022-10-12 RX ORDER — TRANEXAMIC ACID 650 MG/1
TABLET ORAL
COMMUNITY
Start: 2022-04-28

## 2022-10-12 NOTE — PROGRESS NOTES
Assessment/Plan:     Diagnoses and all orders for this visit:    Encounter for well adult exam without abnormal findings    Tachycardia  -     Holter monitor; Future  -     TSH, 3rd generation; Future  -     Lyme Total Antibody Profile with reflex to WB; Future  -     Sedimentation rate, automated; Future    Vitamin D deficiency  -     Vitamin D 1,25 dihydroxy; Future    Screening for cardiovascular condition  -     CBC; Future  -     Comprehensive metabolic panel; Future  -     Lipid panel; Future  -     UA (URINE) with reflex to Scope; Future    Iron deficiency anemia, unspecified iron deficiency anemia type  -     CBC; Future  -     Iron; Future    Other orders  -     Tranexamic Acid 650 MG TABS; Take 2 tablets 3 times a day on heaviest days of flow for up to 5 days  labs ordered   Holter monitor ordered   She continue her current medications  She will follow up with her psychiatric specialist  She follow-up with me in and needed or 6-12 months      Subjective:     Cc: Tachycardia /health maintenance     Patient ID: Afia Renee is a 55 y o  female  Patient presents today for tachycardia issues   Patient knows that her heart races when she gets angry   But she noticed on her Fitbit that her heart is racing more frequently  She denies any palpitations  or shortness of breath or chest pain      The following portions of the patient's history were reviewed and updated as appropriate: allergies, current medications, past family history, past medical history, past social history, past surgical history and problem list     Review of Systems   Constitutional: Negative  HENT: Negative  Eyes: Negative  Respiratory: Negative  Cardiovascular: Negative  Gastrointestinal: Negative  Endocrine: Negative  Genitourinary: Negative  Musculoskeletal: Negative  Skin: Negative  Allergic/Immunologic: Negative  Neurological: Negative  Hematological: Negative      Psychiatric/Behavioral: Negative  All other systems reviewed and are negative  Objective:    Vitals:    10/12/22 1432   BP: 120/80   BP Location: Left arm   Patient Position: Sitting   Cuff Size: Large   Pulse: 87   Resp: 18   Temp: 97 5 °F (36 4 °C)   TempSrc: Tympanic   SpO2: 99%   Weight: 72 8 kg (160 lb 9 6 oz)   Height: 5' (1 524 m)          Physical Exam  Vitals and nursing note reviewed  Constitutional:       Appearance: She is well-developed  HENT:      Head: Normocephalic and atraumatic  Right Ear: External ear normal       Left Ear: External ear normal    Eyes:      Conjunctiva/sclera: Conjunctivae normal       Pupils: Pupils are equal, round, and reactive to light  Cardiovascular:      Rate and Rhythm: Normal rate and regular rhythm  Heart sounds: Normal heart sounds  Pulmonary:      Effort: Pulmonary effort is normal       Breath sounds: Normal breath sounds  Abdominal:      General: Bowel sounds are normal       Palpations: Abdomen is soft  Musculoskeletal:         General: Normal range of motion  Cervical back: Normal range of motion  Skin:     General: Skin is warm and dry  Neurological:      Mental Status: She is alert and oriented to person, place, and time  Deep Tendon Reflexes: Reflexes are normal and symmetric  Psychiatric:         Behavior: Behavior normal          Thought Content: Thought content normal          Judgment: Judgment normal              BMI Counseling: Body mass index is 31 37 kg/m²  The BMI is above normal  Nutrition recommendations include reducing portion sizes, decreasing overall calorie intake, 3-5 servings of fruits/vegetables daily, reducing fast food intake, consuming healthier snacks, decreasing soda and/or juice intake, moderation in carbohydrate intake, increasing intake of lean protein, reducing intake of saturated fat and trans fat and reducing intake of cholesterol  Exercise recommendations include exercising 3-5 times per week

## 2022-10-13 ENCOUNTER — TELEPHONE (OUTPATIENT)
Dept: ADMINISTRATIVE | Facility: OTHER | Age: 47
End: 2022-10-13

## 2022-10-13 NOTE — TELEPHONE ENCOUNTER
----- Message from Dutch Esteves DO sent at 10/12/2022  2:54 PM EDT -----  Regarding: care gap request  10/12/22 2:54 PM    Hello, our patient attached above has had Mammogram completed/performed  Please assist in updating the patient chart by pulling the Care Everywhere (CE) document  The date of service is 12/21       Thank you,  [facility]

## 2022-10-18 LAB
25(OH)D3+25(OH)D2 SERPL-MCNC: 25.7 NG/ML (ref 30–100)
ALBUMIN SERPL-MCNC: 4 G/DL (ref 3.8–4.8)
ALBUMIN/GLOB SERPL: 2 {RATIO} (ref 1.2–2.2)
ALP SERPL-CCNC: 39 IU/L (ref 44–121)
ALT SERPL-CCNC: 9 IU/L (ref 0–32)
APPEARANCE UR: CLEAR
AST SERPL-CCNC: 11 IU/L (ref 0–40)
B BURGDOR IGG PATRN SER IB-IMP: NEGATIVE
B BURGDOR IGM PATRN SER IB-IMP: NEGATIVE
B BURGDOR18KD IGG SER QL IB: ABNORMAL
B BURGDOR23KD IGG SER QL IB: ABNORMAL
B BURGDOR23KD IGM SER QL IB: ABNORMAL
B BURGDOR28KD IGG SER QL IB: PRESENT
B BURGDOR30KD IGG SER QL IB: ABNORMAL
B BURGDOR39KD IGG SER QL IB: ABNORMAL
B BURGDOR39KD IGM SER QL IB: ABNORMAL
B BURGDOR41KD IGG SER QL IB: PRESENT
B BURGDOR41KD IGM SER QL IB: ABNORMAL
B BURGDOR45KD IGG SER QL IB: ABNORMAL
B BURGDOR58KD IGG SER QL IB: PRESENT
B BURGDOR66KD IGG SER QL IB: ABNORMAL
B BURGDOR93KD IGG SER QL IB: ABNORMAL
BASOPHILS # BLD AUTO: 0 X10E3/UL (ref 0–0.2)
BASOPHILS NFR BLD AUTO: 1 %
BILIRUB SERPL-MCNC: 0.5 MG/DL (ref 0–1.2)
BILIRUB UR QL STRIP: NEGATIVE
BUN SERPL-MCNC: 12 MG/DL (ref 6–24)
BUN/CREAT SERPL: 15 (ref 9–23)
CALCIUM SERPL-MCNC: 8.5 MG/DL (ref 8.7–10.2)
CHLORIDE SERPL-SCNC: 104 MMOL/L (ref 96–106)
CHOLEST SERPL-MCNC: 187 MG/DL (ref 100–199)
CO2 SERPL-SCNC: 23 MMOL/L (ref 20–29)
COLOR UR: YELLOW
CREAT SERPL-MCNC: 0.81 MG/DL (ref 0.57–1)
EGFR: 91 ML/MIN/1.73
EOSINOPHIL # BLD AUTO: 0.2 X10E3/UL (ref 0–0.4)
EOSINOPHIL NFR BLD AUTO: 3 %
ERYTHROCYTE [DISTWIDTH] IN BLOOD BY AUTOMATED COUNT: 13.2 % (ref 11.7–15.4)
ERYTHROCYTE [SEDIMENTATION RATE] IN BLOOD BY WESTERGREN METHOD: 2 MM/HR (ref 0–32)
GLOBULIN SER-MCNC: 2 G/DL (ref 1.5–4.5)
GLUCOSE SERPL-MCNC: 87 MG/DL (ref 70–99)
GLUCOSE UR QL: NEGATIVE
HCT VFR BLD AUTO: 38 % (ref 34–46.6)
HDLC SERPL-MCNC: 53 MG/DL
HGB BLD-MCNC: 12.8 G/DL (ref 11.1–15.9)
HGB UR QL STRIP: NEGATIVE
IMM GRANULOCYTES # BLD: 0 X10E3/UL (ref 0–0.1)
IMM GRANULOCYTES NFR BLD: 0 %
IRON SERPL-MCNC: 71 UG/DL (ref 27–159)
KETONES UR QL STRIP: NEGATIVE
LDLC SERPL CALC-MCNC: 119 MG/DL (ref 0–99)
LEUKOCYTE ESTERASE UR QL STRIP: NEGATIVE
LYMPHOCYTES # BLD AUTO: 1.5 X10E3/UL (ref 0.7–3.1)
LYMPHOCYTES NFR BLD AUTO: 24 %
MCH RBC QN AUTO: 31.4 PG (ref 26.6–33)
MCHC RBC AUTO-ENTMCNC: 33.7 G/DL (ref 31.5–35.7)
MCV RBC AUTO: 93 FL (ref 79–97)
MICRO URNS: NORMAL
MONOCYTES # BLD AUTO: 0.6 X10E3/UL (ref 0.1–0.9)
MONOCYTES NFR BLD AUTO: 9 %
NEUTROPHILS # BLD AUTO: 3.9 X10E3/UL (ref 1.4–7)
NEUTROPHILS NFR BLD AUTO: 63 %
NITRITE UR QL STRIP: NEGATIVE
PH UR STRIP: 7 [PH] (ref 5–7.5)
PLATELET # BLD AUTO: 185 X10E3/UL (ref 150–450)
POTASSIUM SERPL-SCNC: 4.1 MMOL/L (ref 3.5–5.2)
PROT SERPL-MCNC: 6 G/DL (ref 6–8.5)
PROT UR QL STRIP: NEGATIVE
RBC # BLD AUTO: 4.08 X10E6/UL (ref 3.77–5.28)
SL AMB VLDL CHOLESTEROL CALC: 15 MG/DL (ref 5–40)
SODIUM SERPL-SCNC: 139 MMOL/L (ref 134–144)
SP GR UR: 1.03 (ref 1–1.03)
TRIGL SERPL-MCNC: 80 MG/DL (ref 0–149)
TSH SERPL DL<=0.005 MIU/L-ACNC: 2.37 UIU/ML (ref 0.45–4.5)
UROBILINOGEN UR STRIP-ACNC: 0.2 MG/DL (ref 0.2–1)
WBC # BLD AUTO: 6.1 X10E3/UL (ref 3.4–10.8)

## 2022-10-19 NOTE — TELEPHONE ENCOUNTER
Upon review of the In Basket request we were able to locate, review, and update the patient chart as requested for Mammogram     Any additional questions or concerns should be emailed to the Practice Liaisons via the appropriate education email address, please do not reply via In Basket      Thank you  Stacia Vidal

## 2022-10-19 NOTE — TELEPHONE ENCOUNTER
Upon review of the In Basket request we were able to locate, review, and update the patient chart as requested for Diabetic Eye Exam     Any additional questions or concerns should be emailed to the Practice Liaisons via the appropriate education email address, please do not reply via In Basket      Thank you  Stacia Vidal

## 2022-11-29 ENCOUNTER — OFFICE VISIT (OUTPATIENT)
Dept: URGENT CARE | Facility: CLINIC | Age: 47
End: 2022-11-29

## 2022-11-29 VITALS
BODY MASS INDEX: 32.39 KG/M2 | RESPIRATION RATE: 18 BRPM | HEART RATE: 85 BPM | TEMPERATURE: 98 F | HEIGHT: 60 IN | OXYGEN SATURATION: 100 % | WEIGHT: 165 LBS

## 2022-11-29 DIAGNOSIS — N30.01 ACUTE CYSTITIS WITH HEMATURIA: Primary | ICD-10-CM

## 2022-11-29 DIAGNOSIS — R30.0 DYSURIA: ICD-10-CM

## 2022-11-29 LAB
SL AMB  POCT GLUCOSE, UA: NEGATIVE
SL AMB LEUKOCYTE ESTERASE,UA: ABNORMAL
SL AMB POCT BILIRUBIN,UA: NEGATIVE
SL AMB POCT BLOOD,UA: ABNORMAL
SL AMB POCT CLARITY,UA: ABNORMAL
SL AMB POCT COLOR,UA: YELLOW
SL AMB POCT KETONES,UA: NEGATIVE
SL AMB POCT NITRITE,UA: NEGATIVE
SL AMB POCT PH,UA: 6.5
SL AMB POCT SPECIFIC GRAVITY,UA: 1.02
SL AMB POCT URINE PROTEIN: ABNORMAL
SL AMB POCT UROBILINOGEN: 0.2

## 2022-11-29 RX ORDER — PHENAZOPYRIDINE HYDROCHLORIDE 100 MG/1
100 TABLET, FILM COATED ORAL 3 TIMES DAILY PRN
Qty: 6 TABLET | Refills: 0 | Status: SHIPPED | OUTPATIENT
Start: 2022-11-29 | End: 2022-12-01

## 2022-11-29 RX ORDER — CEPHALEXIN 500 MG/1
500 CAPSULE ORAL EVERY 12 HOURS SCHEDULED
Qty: 14 CAPSULE | Refills: 0 | Status: SHIPPED | OUTPATIENT
Start: 2022-11-29 | End: 2022-12-06

## 2022-11-29 NOTE — PATIENT INSTRUCTIONS
Urinary Tract Infection in Women   WHAT YOU NEED TO KNOW:   A urinary tract infection (UTI) is caused by bacteria that get inside your urinary tract  Most bacteria that enter your urinary tract come out when you urinate  If the bacteria stay in your urinary tract, you may get an infection  Your urinary tract includes your kidneys, ureters, bladder, and urethra  Urine is made in your kidneys, and it flows from the ureters to the bladder  Urine leaves the bladder through the urethra  A UTI is more common in your lower urinary tract, which includes your bladder and urethra  DISCHARGE INSTRUCTIONS:   Return to the emergency department if:   You are urinating very little or not at all  You have a high fever with shaking chills  You have side or back pain that gets worse  Call your doctor if:   You have a fever  You do not feel better after 2 days of taking antibiotics  You are vomiting  You have questions or concerns about your condition or care  Medicines:   Antibiotics  help fight a bacterial infection  If you have UTIs often (called recurrent UTIs), you may be given antibiotics to take regularly  You will be given directions for when and how to use antibiotics  The goal is to prevent UTIs but not cause antibiotic resistance by using antibiotics too often  Medicines  may be given to decrease pain and burning when you urinate  They will also help decrease the feeling that you need to urinate often  These medicines will make your urine orange or red  Take your medicine as directed  Contact your healthcare provider if you think your medicine is not helping or if you have side effects  Tell him or her if you are allergic to any medicine  Keep a list of the medicines, vitamins, and herbs you take  Include the amounts, and when and why you take them  Bring the list or the pill bottles to follow-up visits  Carry your medicine list with you in case of an emergency      Follow up with your doctor as directed:  Write down your questions so you remember to ask them during your visits  Prevent another UTI:   Empty your bladder often  Urinate and empty your bladder as soon as you feel the need  Do not hold your urine for long periods of time  Wipe from front to back after you urinate or have a bowel movement  This will help prevent germs from getting into your urinary tract through your urethra  Drink liquids as directed  Ask how much liquid to drink each day and which liquids are best for you  You may need to drink more liquids than usual to help flush out the bacteria  Do not drink alcohol, caffeine, or citrus juices  These can irritate your bladder and increase your symptoms  Your healthcare provider may recommend cranberry juice to help prevent a UTI  Urinate after you have sex  This can help flush out bacteria passed during sex  Do not douche or use feminine deodorants  These can change the chemical balance in your vagina  Change sanitary pads or tampons often  This will help prevent germs from getting into your urinary tract  Talk to your healthcare provider about your birth control method  You may need to change your method if it is increasing your risk for UTIs  Wear cotton underwear and clothes that are loose  Tight pants and nylon underwear can trap moisture and cause bacteria to grow  Vaginal estrogen may be recommended  This medicine helps prevent UTIs in women who have gone through menopause or are in tori-menopause  Do pelvic muscle exercises often  Pelvic muscle exercises may help you start and stop urinating  Strong pelvic muscles may help you empty your bladder easier  Squeeze these muscles tightly for 5 seconds like you are trying to hold back urine  Then relax for 5 seconds  Gradually work up to squeezing for 10 seconds  Do 3 sets of 15 repetitions a day, or as directed      © Copyright [employer] 2022 Information is for End User's use only and may not be sold, redistributed or otherwise used for commercial purposes  All illustrations and images included in CareNotes® are the copyrighted property of A D A M , Inc  or Ayden Gardiner  The above information is an  only  It is not intended as medical advice for individual conditions or treatments  Talk to your doctor, nurse or pharmacist before following any medical regimen to see if it is safe and effective for you

## 2022-11-29 NOTE — PROGRESS NOTES
[facility]        NAME: Rg Levy is a 55 y o  female  : 1975    MRN: [id number]  DATE: 2022  TIME: 5:30 PM    Assessment and Plan   Acute cystitis with hematuria [N30 01]  1  Acute cystitis with hematuria  cephalexin (KEFLEX) 500 mg capsule    phenazopyridine (PYRIDIUM) 100 mg tablet      2  Dysuria  POCT urine dip    Urine culture            Patient Instructions       Follow up with PCP in 3-5 days  Proceed to  ER if symptoms worsen  Chief Complaint     Chief Complaint   Patient presents with   • Possible UTI     Patient reports urinary frequency and dysuria with symptom onset this afternoon  History of Present Illness       HPI    Review of Systems   Review of Systems      Current Medications       Current Outpatient Medications:   •  Botulinum Toxin Type A (Botox) 200 units SOLR, Inject as directed Every 3 months, Disp: , Rfl:   •  cephalexin (KEFLEX) 500 mg capsule, Take 1 capsule (500 mg total) by mouth every 12 (twelve) hours for 7 days, Disp: 14 capsule, Rfl: 0  •  ergocalciferol (VITAMIN D2) 50,000 units, Take 50,000 Units by mouth once a week, Disp: , Rfl:   •  phenazopyridine (PYRIDIUM) 100 mg tablet, Take 1 tablet (100 mg total) by mouth 3 (three) times a day as needed for bladder spasms for up to 2 days, Disp: 6 tablet, Rfl: 0  •  venlafaxine (EFFEXOR-XR) 150 mg 24 hr capsule, Take 1 capsule (150 mg total) by mouth daily, Disp: 90 capsule, Rfl: 3  •  albuterol (Proventil HFA) 90 mcg/act inhaler, Inhale 2 puffs as needed  (Patient not taking: Reported on 2022), Disp: , Rfl:   •  cloNIDine (CATAPRES) 0 1 mg tablet, Take 0 1 mg by mouth every 12 (twelve) hours (Patient not taking: Reported on 10/12/2022), Disp: , Rfl:   •  Galcanezumab-gnlm 120 MG/ML SOAJ, Inject 120 mg under the skin every 30 (thirty) days This is a 90 day supply, please dispense 3 pens   (Patient not taking: Reported on 2022), Disp: 3 mL, Rfl: 3  •  Galcanezumab-gnlm 120 MG/ML SOAJ, Inject 120 mg under the skin every 30 (thirty) days (Patient not taking: Reported on 2022), Disp: 1 mL, Rfl: 11  •  loratadine (CLARITIN) 10 mg tablet, Take 10 mg by mouth daily (Patient not taking: Reported on 2022), Disp: , Rfl:   •  methylPREDNISolone 4 MG tablet therapy pack, Use as directed on package (Patient not taking: Reported on 2022), Disp: 1 each, Rfl: 0  •  naproxen (NAPROSYN) 500 mg tablet, TAKE 1 TABLET IN THE MORNING DURING MENSTRUAL CYCLE (Patient not taking: Reported on 2022), Disp: 45 tablet, Rfl: 3  •  Rimegepant Sulfate (Nurtec) 75 MG TBDP, Take 75 mg by mouth as needed (migraine) Limit of 1 in 24 hours (Patient not taking: Reported on 2022), Disp: 8 tablet, Rfl: 3  •  rizatriptan (MAXALT) 10 MG tablet, TAKE 1 TABLET AS NEEDED FOR MIGRAINE   MAY REPEAT IN 2 HOURS IF NEEDED  LIMIT 3 A WEEK OR 9 A MONTH  (THIS IS A 90 DAY SUPPLY) (Patient not taking: Reported on 2022), Disp: 27 tablet, Rfl: 3  •  Sod Picosulfate-Mag Ox-Cit Acd (Clenpiq) 10-3 5-12 MG-GM -GM/160ML SOLN, Take 1 kit by mouth see administration instructions (Patient not taking: Reported on 2022), Disp: 160 mL, Rfl: 0  •  Tranexamic Acid 650 MG TABS, Take 2 tablets 3 times a day on heaviest days of flow for up to 5 days   (Patient not taking: Reported on 2022), Disp: , Rfl:     Current Allergies     Allergies as of 2022 - Reviewed 2022   Allergen Reaction Noted   • Bee venom Anaphylaxis and Swelling 2021            The following portions of the patient's history were reviewed and updated as appropriate: allergies, current medications, past family history, past medical history, past social history, past surgical history and problem list      Past Medical History:   Diagnosis Date   • Migraine        Past Surgical History:   Procedure Laterality Date   • APPENDECTOMY     •  SECTION      With ovarian cyst removed   • TUBAL LIGATION         Family History   Problem Relation Age of Onset   • Clotting disorder Mother    • No Known Problems Father          Medications have been verified  Objective   Pulse 85   Temp 98 °F (36 7 °C)   Resp 18   Ht 5' (1 524 m)   Wt 74 8 kg (165 lb)   SpO2 100%   BMI 32 22 kg/m²   No LMP recorded         Physical Exam     Physical Exam

## 2022-12-01 LAB — BACTERIA UR CULT: NORMAL

## 2022-12-14 DIAGNOSIS — G43.009 MIGRAINE WITHOUT AURA AND WITHOUT STATUS MIGRAINOSUS, NOT INTRACTABLE: ICD-10-CM

## 2022-12-14 RX ORDER — NAPROXEN 500 MG/1
TABLET ORAL
Qty: 45 TABLET | Refills: 3 | Status: SHIPPED | OUTPATIENT
Start: 2022-12-14

## 2022-12-20 DIAGNOSIS — Z12.31 ENCOUNTER FOR SCREENING MAMMOGRAM FOR BREAST CANCER: ICD-10-CM

## 2023-01-16 ENCOUNTER — OFFICE VISIT (OUTPATIENT)
Dept: NEUROLOGY | Facility: CLINIC | Age: 48
End: 2023-01-16
Payer: COMMERCIAL

## 2023-01-16 VITALS
OXYGEN SATURATION: 99 % | TEMPERATURE: 97.7 F | DIASTOLIC BLOOD PRESSURE: 66 MMHG | SYSTOLIC BLOOD PRESSURE: 128 MMHG | HEART RATE: 92 BPM

## 2023-01-16 DIAGNOSIS — G43.709 CHRONIC MIGRAINE WITHOUT AURA WITHOUT STATUS MIGRAINOSUS, NOT INTRACTABLE: Primary | ICD-10-CM

## 2023-01-16 PROCEDURE — 64615 CHEMODENERV MUSC MIGRAINE: CPT | Performed by: PSYCHIATRY & NEUROLOGY

## 2023-01-16 PROCEDURE — 99999 PR OFFICE/OUTPT VISIT,PROCEDURE ONLY: CPT | Performed by: PSYCHIATRY & NEUROLOGY

## 2023-01-16 RX ORDER — PHENAZOPYRIDINE HYDROCHLORIDE 100 MG/1
TABLET, FILM COATED ORAL
COMMUNITY
Start: 2022-11-29

## 2023-01-16 NOTE — PROGRESS NOTES
Injection Procedures    Date/Time: 1/16/2023 2:40 PM  Performed by: Yolanda Nassar MD  Authorized by: Yolanda Nassar MD     Consent:     Consent obtained:  Written    Consent given by:  Patient       Main [facility]  Yolanda Nassar MD  [address] [place] [address] ([address])  [place] [address]       Indication: Chronic migraine headache     Procedure details:     Position:  Upright    Botulinum toxin:    Botox Type:  Type A    Brand:  Botox      Procedures:          L  injection amount:  5 units     R  injection amount:  5 units    Procerus injection amount:  5 units      L lateral frontalis:  5 units     R lateral frontalis:  5 units     L medial frontalis:  5 units     R medial frontalis:  5 units      L temporalis injection amount:  20 units     R temporalis injection amount:  20 units      L medial occipitalis injection amount:  15 units     R medial occipitalis injection amount:  15 units      L superior cervical paraspinal injection amount:  10 units     R superior cervical paraspinal injection amount:  10 units      L superior trapezius injection amount:  15 units     R superior trapezius injection amount:  15 units    Total Units:     Total units used:  155 units    Total units discarded:  45 units    Post-procedure details:     Chemodenervation:  Chronic migraine headaches    Patient tolerance of procedure:  Tolerated well, no immediate complications

## 2023-05-19 DIAGNOSIS — G43.709 CHRONIC MIGRAINE WITHOUT AURA WITHOUT STATUS MIGRAINOSUS, NOT INTRACTABLE: Primary | ICD-10-CM

## 2023-06-13 ENCOUNTER — OFFICE VISIT (OUTPATIENT)
Dept: NEUROLOGY | Facility: CLINIC | Age: 48
End: 2023-06-13
Attending: NURSE PRACTITIONER
Payer: COMMERCIAL

## 2023-06-13 VITALS
BODY MASS INDEX: 32 KG/M2 | TEMPERATURE: 97.8 F | OXYGEN SATURATION: 99 % | HEIGHT: 60 IN | SYSTOLIC BLOOD PRESSURE: 120 MMHG | HEART RATE: 78 BPM | DIASTOLIC BLOOD PRESSURE: 76 MMHG | RESPIRATION RATE: 16 BRPM | WEIGHT: 163 LBS

## 2023-06-13 DIAGNOSIS — G43.709 CHRONIC MIGRAINE WITHOUT AURA WITHOUT STATUS MIGRAINOSUS, NOT INTRACTABLE: ICD-10-CM

## 2023-06-13 PROCEDURE — 64615 CHEMODENERV MUSC MIGRAINE: CPT | Performed by: NURSE PRACTITIONER

## 2023-06-13 PROCEDURE — 99999 PR OFFICE/OUTPT VISIT,PROCEDURE ONLY: CPT | Performed by: NURSE PRACTITIONER

## 2023-06-13 NOTE — PROGRESS NOTES
Patient returns for injections   Indication: Chronic migraine headache      Procedure details:     Position:  Upright     Botulinum toxin:    Botox Type:  Type A    Brand:  Botox  Lot : [id number]  Exp date: 02/2026    NDC# 3175-2248-83         Procedures:          L  injection amount:  5 units     R  injection amount:  5 units    Procerus injection amount:  5 units       L lateral frontalis:  5 units     R lateral frontalis:  5 units     L medial frontalis:  5 units     R medial frontalis:  5 units       L temporalis injection amount:  20 units     R temporalis injection amount:  20 units       L medial occipitalis injection amount:  15 units     R medial occipitalis injection amount:  15 units       L superior cervical paraspinal injection amount:  10 units     R superior cervical paraspinal injection amount:  10 units       L superior trapezius injection amount:  15 units     R superior trapezius injection amount:  15 units     Total Units:     Total units used:  155 units    Total units discarded:  45 units     Post-procedure details:     Chemodenervation:  Chronic migraine headaches    Patient tolerance of procedure:  Tolerated well, no immediate complications    Diagnoses and all orders for this visit:    Chronic migraine without aura without status migrainosus, not intractable  -     [facility] Botulinum Toxin Injection Appointment Request  -     onabotulinumtoxinA (BOTOX) 200 unit injection 200 Units

## 2023-06-19 ENCOUNTER — DOCUMENTATION (OUTPATIENT)
Dept: FAMILY MEDICINE CLINIC | Facility: CLINIC | Age: 48
End: 2023-06-19

## 2023-06-19 DIAGNOSIS — F41.9 ANXIETY: Primary | ICD-10-CM

## 2023-06-19 RX ORDER — HYDROXYZINE HYDROCHLORIDE 25 MG/1
25 TABLET, FILM COATED ORAL EVERY 6 HOURS PRN
Qty: 30 TABLET | Refills: 0 | Status: SHIPPED | OUTPATIENT
Start: 2023-06-19 | End: 2023-06-26
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
## 2023-08-07 DIAGNOSIS — R53.83 OTHER FATIGUE: ICD-10-CM

## 2023-08-07 DIAGNOSIS — D50.9 IRON DEFICIENCY ANEMIA, UNSPECIFIED IRON DEFICIENCY ANEMIA TYPE: Primary | ICD-10-CM

## 2023-08-12 LAB
ALBUMIN SERPL-MCNC: 4.3 G/DL (ref 3.9–4.9)
ALBUMIN/GLOB SERPL: 1.9 {RATIO} (ref 1.2–2.2)
ALP SERPL-CCNC: 48 IU/L (ref 44–121)
ALT SERPL-CCNC: 8 IU/L (ref 0–32)
APPEARANCE UR: CLEAR
AST SERPL-CCNC: 12 IU/L (ref 0–40)
B BURGDOR IGG PATRN SER IB-IMP: NEGATIVE
B BURGDOR IGM PATRN SER IB-IMP: NEGATIVE
B BURGDOR18KD IGG SER QL IB: ABNORMAL
B BURGDOR23KD IGG SER QL IB: ABNORMAL
B BURGDOR23KD IGM SER QL IB: ABNORMAL
B BURGDOR28KD IGG SER QL IB: ABNORMAL
B BURGDOR30KD IGG SER QL IB: ABNORMAL
B BURGDOR39KD IGG SER QL IB: ABNORMAL
B BURGDOR39KD IGM SER QL IB: ABNORMAL
B BURGDOR41KD IGG SER QL IB: PRESENT
B BURGDOR41KD IGM SER QL IB: ABNORMAL
B BURGDOR45KD IGG SER QL IB: ABNORMAL
B BURGDOR58KD IGG SER QL IB: PRESENT
B BURGDOR66KD IGG SER QL IB: ABNORMAL
B BURGDOR93KD IGG SER QL IB: ABNORMAL
BASOPHILS # BLD AUTO: 0.1 X10E3/UL (ref 0–0.2)
BASOPHILS NFR BLD AUTO: 1 %
BILIRUB SERPL-MCNC: 0.6 MG/DL (ref 0–1.2)
BILIRUB UR QL STRIP: NEGATIVE
BUN SERPL-MCNC: 13 MG/DL (ref 6–24)
BUN/CREAT SERPL: 15 (ref 9–23)
CALCIUM SERPL-MCNC: 9.3 MG/DL (ref 8.7–10.2)
CHLORIDE SERPL-SCNC: 105 MMOL/L (ref 96–106)
CO2 SERPL-SCNC: 24 MMOL/L (ref 20–29)
COLOR UR: YELLOW
CREAT SERPL-MCNC: 0.86 MG/DL (ref 0.57–1)
EGFR: 84 ML/MIN/1.73
EOSINOPHIL # BLD AUTO: 0.1 X10E3/UL (ref 0–0.4)
EOSINOPHIL NFR BLD AUTO: 3 %
ERYTHROCYTE [DISTWIDTH] IN BLOOD BY AUTOMATED COUNT: 13.3 % (ref 11.7–15.4)
ERYTHROCYTE [SEDIMENTATION RATE] IN BLOOD BY WESTERGREN METHOD: 3 MM/HR (ref 0–32)
GLOBULIN SER-MCNC: 2.3 G/DL (ref 1.5–4.5)
GLUCOSE SERPL-MCNC: 84 MG/DL (ref 70–99)
GLUCOSE UR QL: NEGATIVE
HCT VFR BLD AUTO: 36.2 % (ref 34–46.6)
HGB BLD-MCNC: 11.8 G/DL (ref 11.1–15.9)
HGB UR QL STRIP: NEGATIVE
IMM GRANULOCYTES # BLD: 0 X10E3/UL (ref 0–0.1)
IMM GRANULOCYTES NFR BLD: 0 %
IRON SERPL-MCNC: 65 UG/DL (ref 27–159)
KETONES UR QL STRIP: NEGATIVE
LEUKOCYTE ESTERASE UR QL STRIP: NEGATIVE
LYMPHOCYTES # BLD AUTO: 1.4 X10E3/UL (ref 0.7–3.1)
LYMPHOCYTES NFR BLD AUTO: 29 %
MCH RBC QN AUTO: 29.1 PG (ref 26.6–33)
MCHC RBC AUTO-ENTMCNC: 32.6 G/DL (ref 31.5–35.7)
MCV RBC AUTO: 89 FL (ref 79–97)
MICRO URNS: NORMAL
MONOCYTES # BLD AUTO: 0.6 X10E3/UL (ref 0.1–0.9)
MONOCYTES NFR BLD AUTO: 11 %
NEUTROPHILS # BLD AUTO: 2.7 X10E3/UL (ref 1.4–7)
NEUTROPHILS NFR BLD AUTO: 56 %
NITRITE UR QL STRIP: NEGATIVE
PH UR STRIP: 6.5 [PH] (ref 5–7.5)
PLATELET # BLD AUTO: 253 X10E3/UL (ref 150–450)
POTASSIUM SERPL-SCNC: 4.5 MMOL/L (ref 3.5–5.2)
PROT SERPL-MCNC: 6.6 G/DL (ref 6–8.5)
PROT UR QL STRIP: NEGATIVE
RBC # BLD AUTO: 4.06 X10E6/UL (ref 3.77–5.28)
SODIUM SERPL-SCNC: 143 MMOL/L (ref 134–144)
SP GR UR: 1.02 (ref 1–1.03)
TSH SERPL DL<=0.005 MIU/L-ACNC: 2.91 UIU/ML (ref 0.45–4.5)
UROBILINOGEN UR STRIP-ACNC: 0.2 MG/DL (ref 0.2–1)
WBC # BLD AUTO: 4.8 X10E3/UL (ref 3.4–10.8)

## 2023-09-19 ENCOUNTER — OFFICE VISIT (OUTPATIENT)
Dept: NEUROLOGY | Facility: CLINIC | Age: 48
End: 2023-09-19
Attending: NURSE PRACTITIONER
Payer: COMMERCIAL

## 2023-09-19 VITALS
WEIGHT: 164 LBS | HEART RATE: 95 BPM | OXYGEN SATURATION: 97 % | SYSTOLIC BLOOD PRESSURE: 140 MMHG | RESPIRATION RATE: 18 BRPM | BODY MASS INDEX: 32.03 KG/M2 | TEMPERATURE: 97.8 F | DIASTOLIC BLOOD PRESSURE: 80 MMHG

## 2023-09-19 DIAGNOSIS — G43.709 CHRONIC MIGRAINE WITHOUT AURA WITHOUT STATUS MIGRAINOSUS, NOT INTRACTABLE: ICD-10-CM

## 2023-09-19 PROCEDURE — 99999 PR OFFICE/OUTPT VISIT,PROCEDURE ONLY: CPT | Performed by: NURSE PRACTITIONER

## 2023-09-19 PROCEDURE — 64615 CHEMODENERV MUSC MIGRAINE: CPT | Performed by: NURSE PRACTITIONER

## 2023-09-19 RX ORDER — RIMEGEPANT SULFATE 75 MG/75MG
75 TABLET, ORALLY DISINTEGRATING ORAL AS NEEDED
Qty: 16 TABLET | Refills: 1 | Status: SHIPPED | OUTPATIENT
Start: 2023-09-19 | End: 2024-04-08 | Stop reason: SDUPTHER

## 2023-09-19 RX ORDER — VENLAFAXINE HYDROCHLORIDE 75 MG/1
150 CAPSULE, EXTENDED RELEASE ORAL DAILY
Qty: 180 CAPSULE | Refills: 1 | Status: SHIPPED
Start: 2023-09-19 | End: 2024-04-03

## 2023-09-19 RX ORDER — RIZATRIPTAN BENZOATE 10 MG/1
TABLET ORAL
Qty: 36 TABLET | Refills: 1 | Status: SHIPPED | OUTPATIENT
Start: 2023-09-19 | End: 2024-04-03 | Stop reason: SDUPTHER

## 2023-09-19 NOTE — PROGRESS NOTES
Patient returns for injections   Indication: Chronic migraine headache      Procedure details:     Position:  Upright     Botulinum toxin:    Botox Type:  Type A    Brand:  Botox    Lot : (2 vials each 100 units ) [id number]  Exp date: (2 vials each 100 units) 10/2025     NDC# 3157-0520-62          Procedures:          L  injection amount:  5 units     R  injection amount:  5 units    Procerus injection amount:  5 units       L lateral frontalis:  5 units     R lateral frontalis:  5 units     L medial frontalis:  5 units     R medial frontalis:  5 units       L temporalis injection amount:  20 units     R temporalis injection amount:  20 units       L medial occipitalis injection amount:  15 units     R medial occipitalis injection amount:  15 units       L superior cervical paraspinal injection amount:  10 units     R superior cervical paraspinal injection amount:  10 units       L superior trapezius injection amount:  15 units     R superior trapezius injection amount:  15 units     Total Units:       Total units used:  155 units    Total units discarded:  45 units     Post-procedure details:       Chemodenervation:  Chronic migraine headaches    Patient tolerance of procedure:  Tolerated well, no immediate complications    Diagnoses and all orders for this visit:    Chronic migraine without aura without status migrainosus, not intractable  -     [facility] Botulinum Toxin Injection Appointment Request    -     rizatriptan (MAXALT) 10 mg tablet; Take one tab at onset of migraine , may repeat in 2 hrs as needed , max 2 tabs per day    -     venlafaxine XR (EFFEXOR-XR) 75 mg 24 hr capsule; Take 2 capsules (150 mg total) by mouth daily.    -     rimegepant (NURTEC ODT) 75 mg tablet,disintegrating; Take 1 tablet (75 mg total) by mouth as needed (migraine). Max one tab per day  -     onabotulinumtoxinA (BOTOX) 200 unit injection 100 Units  -     onabotulinumtoxinA (BOTOX) 200 unit injection 100 Units

## 2024-01-08 ENCOUNTER — OFFICE VISIT (OUTPATIENT)
Dept: NEUROLOGY | Facility: CLINIC | Age: 49
End: 2024-01-08
Attending: NURSE PRACTITIONER
Payer: COMMERCIAL

## 2024-01-08 VITALS
OXYGEN SATURATION: 99 % | TEMPERATURE: 97.9 F | SYSTOLIC BLOOD PRESSURE: 158 MMHG | HEART RATE: 94 BPM | RESPIRATION RATE: 18 BRPM | DIASTOLIC BLOOD PRESSURE: 84 MMHG

## 2024-01-08 DIAGNOSIS — G43.709 CHRONIC MIGRAINE WITHOUT AURA WITHOUT STATUS MIGRAINOSUS, NOT INTRACTABLE: ICD-10-CM

## 2024-01-08 PROCEDURE — 99999 PR OFFICE/OUTPT VISIT,PROCEDURE ONLY: CPT | Performed by: NURSE PRACTITIONER

## 2024-01-08 PROCEDURE — 64615 CHEMODENERV MUSC MIGRAINE: CPT | Performed by: NURSE PRACTITIONER

## 2024-01-08 NOTE — PROGRESS NOTES
Indication: Chronic migraine headache      Procedure details:     Position:  Upright     Botulinum toxin:    Botox Type:  Type A    Brand:  Botox     Lot : [id number]  Exp date: 09/2025     NDC# 4101-5588-22          Procedures:          L  injection amount:  5 units     R  injection amount:  5 units    Procerus injection amount:  5 units       L lateral frontalis:  5 units     R lateral frontalis:  5 units     L medial frontalis:  5 units     R medial frontalis:  5 units       L temporalis injection amount:  20 units     R temporalis injection amount:  20 units       L medial occipitalis injection amount:  15 units     R medial occipitalis injection amount:  15 units       L superior cervical paraspinal injection amount:  10 units     R superior cervical paraspinal injection amount:  10 units       L superior trapezius injection amount:  15 units     R superior trapezius injection amount:  15 units     Total Units:        Total units used:  155 units    Total units discarded:  45 units     Post-procedure details:        Chemodenervation:  Chronic migraine headaches    Patient tolerance of procedure:  Tolerated well, no immediate complications    Diagnoses and all orders for this visit:    Chronic migraine without aura without status migrainosus, not intractable  -     [facility] Botulinum Toxin Injection Appointment Request  -     onabotulinumtoxinA (BOTOX) 200 unit injection 200 Units

## 2024-04-03 ENCOUNTER — OFFICE VISIT (OUTPATIENT)
Dept: NEUROLOGY | Facility: CLINIC | Age: 49
End: 2024-04-03
Attending: NURSE PRACTITIONER
Payer: COMMERCIAL

## 2024-04-03 VITALS
RESPIRATION RATE: 16 BRPM | OXYGEN SATURATION: 98 % | TEMPERATURE: 98.2 F | DIASTOLIC BLOOD PRESSURE: 82 MMHG | HEART RATE: 77 BPM | SYSTOLIC BLOOD PRESSURE: 120 MMHG

## 2024-04-03 DIAGNOSIS — G43.709 CHRONIC MIGRAINE WITHOUT AURA WITHOUT STATUS MIGRAINOSUS, NOT INTRACTABLE: ICD-10-CM

## 2024-04-03 PROCEDURE — 64615 CHEMODENERV MUSC MIGRAINE: CPT | Performed by: PSYCHIATRY & NEUROLOGY

## 2024-04-03 PROCEDURE — 99999 PR OFFICE/OUTPT VISIT,PROCEDURE ONLY: CPT | Performed by: PSYCHIATRY & NEUROLOGY

## 2024-04-03 RX ORDER — RIZATRIPTAN BENZOATE 10 MG/1
TABLET ORAL
Qty: 36 TABLET | Refills: 1 | Status: SHIPPED | OUTPATIENT
Start: 2024-04-03 | End: 2024-04-08 | Stop reason: SDUPTHER

## 2024-04-03 NOTE — PROGRESS NOTES
Injection Procedures    Date/Time: 4/3/2024 1:33 PM    Performed by: Yolanda Nassar MD  Authorized by: Yolanda Nassar MD    Consent:     Consent obtained:  Written    Consent given by:  Patient       [facility]  Yolanda Nassar MD  [address] [place] [address] ([address])  [place] [id number]       Indication: Chronic migraine headache     Procedure details:     Position:  Upright    Botulinum toxin:    Botox Type:  Type A    Brand:  Botox    Botox 200  NDC#3885-1204-59  LOT#[id number]  EXP-06/2026    Since last seen headaches have improved with Botox injections. Since starting botox, the patient reports greater than 7 days of migraine relief from baseline, correlated with headache diary, decreased abortive medication use.        Procedures:          L  injection amount:  5 units     R  injection amount:  5 units    Procerus injection amount:  5 units      L lateral frontalis:  5 units     R lateral frontalis:  5 units     L medial frontalis:  5 units     R medial frontalis:  5 units      L temporalis injection amount:  20 units     R temporalis injection amount:  20 units      L medial occipitalis injection amount:  15 units     R medial occipitalis injection amount:  15 units      L superior cervical paraspinal injection amount:  10 units     R superior cervical paraspinal injection amount:  10 units      L superior trapezius injection amount:  15 units     R superior trapezius injection amount:  15 units    Total Units:     Total units used:  155 units    Total units discarded:  45 units    Post-procedure details:     Chemodenervation:  Chronic migraine headaches    Patient tolerance of procedure:  Tolerated well, no immediate complications

## 2024-06-24 NOTE — PROGRESS NOTES
Blanca Chávez is a 48 y.o. old female with hx of PTSD , concussion 4 years ago  , presenting today for follow up     She has been using Maxalt 10 mg - for severe level headache - it works well.  She reports she used Nurtec previously -but no need to take due to Maxalt works very well.    She reports she has been having increased stress lately and stress at work , this could have caused the flare up in frequency and severity of headaches in the last 2-3 weeks ,  she reports she eliminated sugary drinks , which helps with weight loss and headaches .      How often do the headaches/pain occur?     Mild headaches: daily for past  3 weeks     Moderate to severe headaches: two episodes within last 2 weeks        Aura/Warning and how long does it last?   None     What time of the day do headaches/pain start?    Mild headaches: afternoon   Moderate to severe headaches: can wakes up with them, or the mild may progress into an intense headache       Describe your usual headache/pain?   Mild headaches: aching  Moderate to severe headaches: throbbing, stabbing     Where is your headache/pain located?     Mild headaches: frontal  Moderate to severe headaches: frontal and radiates to the apex of her head   in the past two weeks - frontal- periorbital       What is the intensity of pain?     Mild headaches: 4/10  Moderate to severe headaches: 7/10     Associated symptoms:   - Decrease appetite,   - Photophobia - mild   - Insomnia   - Problems with concentration     - Prefer to be in a cool, quiet, dark room     Headache triggers: Stress, tension, menstruation, weather change    What time of the year do headaches occur more frequently?      What treatment have you had in the past or currently using for headaches/pain/mood?   Trigger point injection/Nerve block performed and how often? NO   Epidural injections or trans foraminal injections performed? YES child birth  Alternative therapies?  chiropractic therapy  CBD or THC for  your headaches and how often? YES did not help  Other headache devices? NO   Botulinum toxin injection performed and how often? NO   Headache infusions:NO   Preventive medication therapy:     -Magnesium 400 mg, vitamin B2 200 mg twice a day  -Emgality 120 mg injection  -Venlafaxine 150 mg,  -Xanax 0.25 mg,  -Lamictal 25 mg, Depakote 500 mg,  -Propanolol, verapamil,    Abortive medication Therapy:   -Fioricet,  -Excedrin Migraine,  -Aleve 220 mg,  -Tylenol 500 mg,  -Sumatriptan 100 mg, rizatriptan 10 mg,  -Nurtec 75 mg  -Compazine 10 mg,  -Methylprednisolone 4 mg      Past Medical History:  has no past medical history on file.  Past Surgical History:  has no past surgical history on file.  Social History:   Social History     Tobacco Use    Smoking status: Never      Diagnoses and all orders for this visit:    Chronic migraine without aura without status migrainosus, not intractable    -     Vitamin B12; Future  -     Vitamin D 25 hydroxy; Future    -     rimegepant (NURTEC ODT) 75 mg tablet,disintegrating; Take 1 tablet (75 mg total) by mouth as needed (migraine). Max one tab per day    -     rizatriptan (MAXALT) 10 mg tablet; Take one tab at onset of migraine , may repeat in 2 hrs as needed , max 2 tabs per day     Can be more consistent with Nurtec 75 mg ODT - take one every other day to help during increase in frequency     Continue Botox treatments for prevention .

## 2024-06-25 ENCOUNTER — OFFICE VISIT (OUTPATIENT)
Dept: NEUROLOGY | Facility: CLINIC | Age: 49
End: 2024-06-25
Payer: COMMERCIAL

## 2024-06-25 VITALS
DIASTOLIC BLOOD PRESSURE: 78 MMHG | TEMPERATURE: 97.9 F | RESPIRATION RATE: 18 BRPM | HEART RATE: 74 BPM | OXYGEN SATURATION: 99 % | BODY MASS INDEX: 30.27 KG/M2 | WEIGHT: 155 LBS | SYSTOLIC BLOOD PRESSURE: 120 MMHG

## 2024-06-25 DIAGNOSIS — G43.709 CHRONIC MIGRAINE WITHOUT AURA WITHOUT STATUS MIGRAINOSUS, NOT INTRACTABLE: Primary | ICD-10-CM

## 2024-06-25 PROCEDURE — 99213 OFFICE O/P EST LOW 20 MIN: CPT | Performed by: NURSE PRACTITIONER

## 2024-06-25 PROCEDURE — 3008F BODY MASS INDEX DOCD: CPT | Performed by: NURSE PRACTITIONER

## 2024-06-25 RX ORDER — FLUOXETINE HYDROCHLORIDE 40 MG/1
40 CAPSULE ORAL DAILY
COMMUNITY
Start: 2024-06-19

## 2024-06-25 RX ORDER — RIMEGEPANT SULFATE 75 MG/75MG
75 TABLET, ORALLY DISINTEGRATING ORAL AS NEEDED
Qty: 16 TABLET | Refills: 3 | Status: SHIPPED | OUTPATIENT
Start: 2024-06-25 | End: 2024-09-03 | Stop reason: SDUPTHER

## 2024-06-25 RX ORDER — HYDROXYZINE HYDROCHLORIDE 50 MG/1
TABLET, FILM COATED ORAL
COMMUNITY
Start: 2024-03-20

## 2024-06-25 RX ORDER — RIZATRIPTAN BENZOATE 10 MG/1
TABLET ORAL
Qty: 36 TABLET | Refills: 1 | Status: SHIPPED | OUTPATIENT
Start: 2024-06-25 | End: 2024-09-03 | Stop reason: SDUPTHER

## 2024-06-26 LAB
25(OH)D3+25(OH)D2 SERPL-MCNC: 23.8 NG/ML (ref 30–100)
VIT B12 SERPL-MCNC: 397 PG/ML (ref 232–1245)

## 2024-07-01 ENCOUNTER — OFFICE VISIT (OUTPATIENT)
Dept: NEUROLOGY | Facility: CLINIC | Age: 49
End: 2024-07-01
Attending: NURSE PRACTITIONER
Payer: COMMERCIAL

## 2024-07-01 VITALS
HEART RATE: 64 BPM | WEIGHT: 155 LBS | RESPIRATION RATE: 18 BRPM | BODY MASS INDEX: 30.27 KG/M2 | TEMPERATURE: 98.2 F | OXYGEN SATURATION: 98 % | SYSTOLIC BLOOD PRESSURE: 120 MMHG | DIASTOLIC BLOOD PRESSURE: 78 MMHG

## 2024-07-01 DIAGNOSIS — G43.709 CHRONIC MIGRAINE WITHOUT AURA WITHOUT STATUS MIGRAINOSUS, NOT INTRACTABLE: ICD-10-CM

## 2024-07-01 PROCEDURE — 64615 CHEMODENERV MUSC MIGRAINE: CPT | Performed by: NURSE PRACTITIONER

## 2024-07-01 PROCEDURE — 99999 PR OFFICE/OUTPT VISIT,PROCEDURE ONLY: CPT | Performed by: NURSE PRACTITIONER

## 2024-07-01 NOTE — PROGRESS NOTES
Indication: Chronic migraine headache      Procedure details:     Position:  Upright     Botulinum toxin:    Botox Type:  Type A    Brand:  Botox     Botox 200  NDC#4493-4461-38  LOT#[id number]  EXP-08/2026     Since last seen headaches have improved with Botox injections. Since starting botox, the patient reports greater than 7 days of migraine relief from baseline, correlated with headache diary, decreased abortive medication use.          Procedures:          L  injection amount:  5 units     R  injection amount:  5 units    Procerus injection amount:  5 units       L lateral frontalis:  5 units     R lateral frontalis:  5 units     L medial frontalis:  5 units     R medial frontalis:  5 units       L temporalis injection amount:  20 units     R temporalis injection amount:  20 units       L medial occipitalis injection amount:  15 units     R medial occipitalis injection amount:  15 units       L superior cervical paraspinal injection amount:  10 units     R superior cervical paraspinal injection amount:  10 units       L superior trapezius injection amount:  15 units     R superior trapezius injection amount:  15 units     Total Units:       Total units used:  155 units    Total units discarded:  45 units     Post-procedure details:     Chemodenervation:  Chronic migraine headaches    Patient tolerance of procedure:  Tolerated well, no immediate complications    Diagnoses and all orders for this visit:    Chronic migraine without aura without status migrainosus, not intractable  -     [facility] Botulinum Toxin Injection Appointment Request  -     onabotulinumtoxinA (BOTOX) 200 unit injection 200 Units

## 2024-09-03 DIAGNOSIS — G43.709 CHRONIC MIGRAINE WITHOUT AURA WITHOUT STATUS MIGRAINOSUS, NOT INTRACTABLE: ICD-10-CM

## 2024-09-04 RX ORDER — DEXAMETHASONE 2 MG/1
TABLET ORAL
Qty: 5 TABLET | Refills: 0 | Status: SHIPPED | OUTPATIENT
Start: 2024-09-04

## 2024-09-04 RX ORDER — RIMEGEPANT SULFATE 75 MG/75MG
75 TABLET, ORALLY DISINTEGRATING ORAL AS NEEDED
Qty: 16 TABLET | Refills: 3 | Status: SHIPPED | OUTPATIENT
Start: 2024-09-04

## 2024-09-04 RX ORDER — RIZATRIPTAN BENZOATE 10 MG/1
TABLET ORAL
Qty: 36 TABLET | Refills: 1 | Status: SHIPPED | OUTPATIENT
Start: 2024-09-04

## 2024-09-23 ENCOUNTER — OFFICE VISIT (OUTPATIENT)
Dept: NEUROLOGY | Facility: CLINIC | Age: 49
End: 2024-09-23
Attending: NURSE PRACTITIONER
Payer: COMMERCIAL

## 2024-09-23 VITALS
WEIGHT: 153 LBS | TEMPERATURE: 98 F | SYSTOLIC BLOOD PRESSURE: 102 MMHG | BODY MASS INDEX: 29.88 KG/M2 | DIASTOLIC BLOOD PRESSURE: 68 MMHG | RESPIRATION RATE: 18 BRPM | HEART RATE: 90 BPM | OXYGEN SATURATION: 99 %

## 2024-09-23 DIAGNOSIS — G43.709 CHRONIC MIGRAINE WITHOUT AURA WITHOUT STATUS MIGRAINOSUS, NOT INTRACTABLE: Primary | ICD-10-CM

## 2024-09-23 DIAGNOSIS — G44.229 CHRONIC TENSION-TYPE HEADACHE, NOT INTRACTABLE: ICD-10-CM

## 2024-09-23 PROCEDURE — 64615 CHEMODENERV MUSC MIGRAINE: CPT | Performed by: NURSE PRACTITIONER

## 2024-09-23 PROCEDURE — 99999 PR OFFICE/OUTPT VISIT,PROCEDURE ONLY: CPT | Performed by: NURSE PRACTITIONER

## 2024-09-23 RX ORDER — MAGNESIUM GLUCONATE 27.5 (500)
500 TABLET ORAL 2 TIMES DAILY
COMMUNITY

## 2024-09-23 NOTE — PATIENT INSTRUCTIONS
Green Light for Migraine Relief: - Green tinted glasses -(narrow-band wavelength) green light at low intensity, as well as affordable sunglasses that block all but this narrow band of pure green light.   Study: Migraine photophobia originating in cone-driven retinal pathways  Brain, Volume 139, Issue 7, July 2016, Pages 8741-6915    Continue :     Magnesium oxide 400 mg daily at bedtime     Riboflavin (B2) - 200 mg in AM daily     Continue Vit B12 - 500 mcg daily in AM     Continue vit D3- 2,000-4,000 IU daily

## 2024-09-23 NOTE — PROGRESS NOTES
Indication: Chronic migraine headache      she reports an uptick in daily tension type headaches , the last 3 months , she states she had more headache days than headache free days .    Procedure details:     Position:  Upright     Botulinum toxin:    Botox Type:  Type A    Brand:  Botox     Botox 200  NDC#8101-2587-74    LOT#[id number]  EXP-12/2026     Since last seen headaches have improved with Botox injections. Since starting botox, the patient reports greater than 7 days of migraine relief from baseline, correlated with headache diary, decreased abortive medication use.          Procedures:          L  injection amount:  5 units     R  injection amount:  5 units    Procerus injection amount:  5 units       L lateral frontalis:  5 units     R lateral frontalis:  5 units     L medial frontalis:  5 units     R medial frontalis:  5 units       L temporalis injection amount:  20 units     R temporalis injection amount:  20 units       L medial occipitalis injection amount:  15 units     R medial occipitalis injection amount:  15 units       L superior cervical paraspinal injection amount:  10 units     R superior cervical paraspinal injection amount:  10 units       L superior trapezius injection amount:  15 units     R superior trapezius injection amount:  15 units     Total Units:        Total units used:  155 units    Total units discarded:  45 units     Post-procedure details:     Chemodenervation:  Chronic migraine headaches    Patient tolerance of procedure:  Tolerated well, no immediate complications    Diagnoses and all orders for this visit:    Chronic migraine without aura without status migrainosus, not intractable    -     onabotulinumtoxinA (BOTOX) 200 unit injection 200 Units  -     [facility] Botulinum Toxin Injection Appointment Request        Green Light for Migraine Relief: - Green tinted glasses -(narrow-band wavelength) green light at low intensity, as well as affordable sunglasses  that block all but this narrow band of pure green light.   Study: Migraine photophobia originating in cone-driven retinal pathways  Brain, Volume 139, Issue 7, July 2016, Pages 4871-6555    Continue :     Magnesium oxide 400 mg daily at bedtime     Riboflavin (B2) - 200 mg in AM daily     Continue Vit B12 - 500 mcg daily in AM     Continue vit D3- 2,000-4,000 IU daily

## 2025-01-06 ENCOUNTER — OFFICE VISIT (OUTPATIENT)
Dept: NEUROLOGY | Facility: CLINIC | Age: 50
End: 2025-01-06
Attending: NURSE PRACTITIONER
Payer: COMMERCIAL

## 2025-01-06 VITALS
WEIGHT: 153 LBS | DIASTOLIC BLOOD PRESSURE: 74 MMHG | HEART RATE: 78 BPM | SYSTOLIC BLOOD PRESSURE: 114 MMHG | OXYGEN SATURATION: 99 % | RESPIRATION RATE: 18 BRPM | BODY MASS INDEX: 29.88 KG/M2

## 2025-01-06 DIAGNOSIS — G43.709 CHRONIC MIGRAINE WITHOUT AURA WITHOUT STATUS MIGRAINOSUS, NOT INTRACTABLE: ICD-10-CM

## 2025-01-06 PROCEDURE — 64615 CHEMODENERV MUSC MIGRAINE: CPT | Performed by: NURSE PRACTITIONER

## 2025-01-06 PROCEDURE — 200200 PR NO CHARGE: Performed by: NURSE PRACTITIONER

## 2025-01-06 PROCEDURE — 99999 PR OFFICE/OUTPT VISIT,PROCEDURE ONLY: CPT | Performed by: NURSE PRACTITIONER

## 2025-01-06 NOTE — PROGRESS NOTES
Indication: Chronic migraine headache     Since last seen headaches have improved with Botox injections. Since starting botox, the patient reports greater than 7 days of migraine relief from baseline, correlated with headache diary, decreased abortive medication use.    At least 50 % of reduction of frequency and severity of moderate to severe headaches       she takes Nurtec every other day for prevention  and Rizatriptan as needed for acute migraine     Procedure details:     Position:  Upright     Botulinum toxin:    Botox Type:  Type A    Brand:  Botox     Botox 200  NDC#1331-4032-42     LOT#[id number]  EXP-03/2027     Since last seen headaches have improved with Botox injections. Since starting botox, the patient reports greater than 7 days of migraine relief from baseline, correlated with headache diary, decreased abortive medication use.       Procedures:          L  injection amount:  5 units     R  injection amount:  5 units    Procerus injection amount:  5 units       L lateral frontalis:  5 units     R lateral frontalis:  5 units     L medial frontalis:  5 units     R medial frontalis:  5 units       L temporalis injection amount:  20 units     R temporalis injection amount:  20 units       L medial occipitalis injection amount:  15 units     R medial occipitalis injection amount:  15 units       L superior cervical paraspinal injection amount:  10 units     R superior cervical paraspinal injection amount:  10 units       L superior trapezius injection amount:  15 units     R superior trapezius injection amount:  15 units     Total Units:        Total units used:  155 units    Total units discarded:  45 units     Post-procedure details:     Chemodenervation:  Chronic migraine headaches    Patient tolerance of procedure:  Tolerated well, no immediate complications    Diagnoses and all orders for this visit:    Chronic migraine without aura without status migrainosus, not intractable  -      [facility] Botulinum Toxin Injection Appointment Request  -     onabotulinumtoxinA (BOTOX) 200 unit injection 200 Units

## 2025-04-21 ENCOUNTER — OFFICE VISIT (OUTPATIENT)
Dept: NEUROLOGY | Facility: CLINIC | Age: 50
End: 2025-04-21
Attending: NURSE PRACTITIONER
Payer: COMMERCIAL

## 2025-04-21 VITALS
WEIGHT: 156 LBS | DIASTOLIC BLOOD PRESSURE: 86 MMHG | RESPIRATION RATE: 18 BRPM | SYSTOLIC BLOOD PRESSURE: 136 MMHG | BODY MASS INDEX: 30.47 KG/M2 | HEART RATE: 60 BPM | OXYGEN SATURATION: 99 %

## 2025-04-21 DIAGNOSIS — G43.709 CHRONIC MIGRAINE WITHOUT AURA WITHOUT STATUS MIGRAINOSUS, NOT INTRACTABLE: ICD-10-CM

## 2025-04-21 PROCEDURE — 99999 PR OFFICE/OUTPT VISIT,PROCEDURE ONLY: CPT | Performed by: NURSE PRACTITIONER

## 2025-04-21 PROCEDURE — 64615 CHEMODENERV MUSC MIGRAINE: CPT | Performed by: NURSE PRACTITIONER

## 2025-04-21 RX ORDER — RIZATRIPTAN BENZOATE 10 MG/1
TABLET ORAL
Qty: 36 TABLET | Refills: 1 | Status: SHIPPED | OUTPATIENT
Start: 2025-04-21

## 2025-04-21 RX ORDER — RIMEGEPANT SULFATE 75 MG/75MG
75 TABLET, ORALLY DISINTEGRATING ORAL AS NEEDED
Qty: 16 TABLET | Refills: 3 | Status: SHIPPED | OUTPATIENT
Start: 2025-04-21

## 2025-04-21 NOTE — PROGRESS NOTES
Indication: Chronic migraine headache      Since last seen headaches have improved with Botox injections. Since starting botox, the patient reports greater than 7 days of migraine relief from baseline, correlated with headache diary, decreased abortive medication use.     At least 50 % of reduction of frequency and severity of moderate to severe headaches        she takes Nurtec every other day for prevention  and Rizatriptan as needed for acute migraine      Procedure details:     Position:  Upright     Botulinum toxin:    Botox Type:  Type A    Brand:  Botox     Botox 200  NDC#5062-8404-32     LOT#[id number]  EXP: 04/2027     Since last seen headaches have improved with Botox injections. Since starting botox, the patient reports greater than 7 days of migraine relief from baseline, correlated with headache diary, decreased abortive medication use.       Procedures:          L  injection amount:  5 units     R  injection amount:  5 units    Procerus injection amount:  5 units       L lateral frontalis:  5 units     R lateral frontalis:  5 units     L medial frontalis:  5 units     R medial frontalis:  5 units       L temporalis injection amount:  20 units     R temporalis injection amount:  20 units       L medial occipitalis injection amount:  15 units     R medial occipitalis injection amount:  15 units       L superior cervical paraspinal injection amount:  10 units     R superior cervical paraspinal injection amount:  10 units       L superior trapezius injection amount:  15 units     R superior trapezius injection amount:  15 units     Total Units:        Total units used:  155 units    Total units discarded:  45 units     Post-procedure details:     Chemodenervation:  Chronic migraine headaches    Patient tolerance of procedure:  Tolerated well, no immediate complications    Diagnoses and all orders for this visit:    Chronic migraine without aura without status migrainosus, not  intractable    -     [facility] Botulinum Toxin Injection Appointment Request  -     botulinum toxin Type A (BOTOX) 200 unit injection 200 Units    -     rimegepant (NURTEC ODT) 75 mg tablet,disintegrating; Take 1 tablet (75 mg total) by mouth as needed (migraine). Max one tab per day    -     rizatriptan (MAXALT) 10 mg tablet; Take one tab at onset of migraine , may repeat in 2 hrs as needed , max 2 tabs per day

## 2025-06-10 ENCOUNTER — OFFICE VISIT (OUTPATIENT)
Dept: FAMILY MEDICINE CLINIC | Facility: CLINIC | Age: 50
End: 2025-06-10
Payer: COMMERCIAL

## 2025-06-10 VITALS
DIASTOLIC BLOOD PRESSURE: 74 MMHG | RESPIRATION RATE: 16 BRPM | WEIGHT: 161 LBS | SYSTOLIC BLOOD PRESSURE: 112 MMHG | HEIGHT: 60 IN | BODY MASS INDEX: 31.61 KG/M2 | OXYGEN SATURATION: 100 % | TEMPERATURE: 98.7 F | HEART RATE: 83 BPM

## 2025-06-10 DIAGNOSIS — E55.9 VITAMIN D DEFICIENCY: ICD-10-CM

## 2025-06-10 DIAGNOSIS — G43.009 MIGRAINE WITHOUT AURA AND WITHOUT STATUS MIGRAINOSUS, NOT INTRACTABLE: ICD-10-CM

## 2025-06-10 DIAGNOSIS — Z91.89 RISK OF EXPOSURE TO LYME DISEASE: ICD-10-CM

## 2025-06-10 DIAGNOSIS — Z13.220 SCREENING FOR LIPID DISORDERS: ICD-10-CM

## 2025-06-10 DIAGNOSIS — R53.82 CHRONIC FATIGUE: Primary | ICD-10-CM

## 2025-06-10 DIAGNOSIS — R01.1 CARDIAC MURMUR: ICD-10-CM

## 2025-06-10 PROCEDURE — 99214 OFFICE O/P EST MOD 30 MIN: CPT | Performed by: FAMILY MEDICINE

## 2025-06-10 RX ORDER — FLUOXETINE HYDROCHLORIDE 40 MG/1
40 CAPSULE ORAL DAILY
COMMUNITY
Start: 2025-04-10

## 2025-06-10 RX ORDER — CETIRIZINE HYDROCHLORIDE 10 MG/1
10 TABLET ORAL DAILY
COMMUNITY

## 2025-06-10 NOTE — PROGRESS NOTES
Tennille Conway 1975 female MRN: [id number]    Family Medicine Acute Visit    ASSESSMENT/PLAN  Problem List Items Addressed This Visit          Cardiovascular and Mediastinum    Migraine without aura and without status migrainosus, not intractable    Follows with neurology at [facility]         Relevant Medications    FLUoxetine (PROzac) 40 MG capsule    Other Relevant Orders    Comprehensive metabolic panel    CBC and differential    TSH, 3rd generation with Free T4 reflex       Other    Vitamin D deficiency    Relevant Orders    Vitamin D 1,25 Dihydroxy    Chronic fatigue - Primary    Relevant Orders    Comprehensive metabolic panel    CBC and differential    TSH, 3rd generation with Free T4 reflex    Ferritin    Iron    Risk of exposure to Lyme disease    Relevant Orders    Lyme Total AB W Reflex to IGM/IGG    Cardiac murmur    Relevant Orders    Echo complete w/ contrast if indicated     Other Visit Diagnoses         Screening for lipid disorders        Relevant Orders    Lipid panel            New patient to me. Has not been seen in office last visit with Dr canada 3 years ago.   Overdue for labs. New murmur on exam  Will check echo and have her follow up for CP to review testing       No future appointments.       SUBJECTIVE  CC: Fatigue (Has found many ticks on her recently ) and Diarrhea (Since starting myoma-has recently stopped )      HPI:  Tennille Conway is a 49 y.o. female who presents for acute visit  She reports fatigue and has been exposed to numerous ticks over the last few weeks  No rash that she knows of.   She reports taking weight loss medication over the internet and since then has been having bowel issues.       Review of Systems   Constitutional:  Positive for fatigue. Negative for chills and fever.   HENT:  Negative for congestion, postnasal drip, rhinorrhea and sinus pressure.    Eyes:  Negative for photophobia and visual disturbance.   Respiratory:  Negative for cough and  shortness of breath.    Cardiovascular:  Negative for chest pain, palpitations and leg swelling.   Gastrointestinal:  Positive for abdominal pain, constipation and diarrhea. Negative for nausea and vomiting.   Genitourinary:  Negative for difficulty urinating and dysuria.   Musculoskeletal:  Negative for arthralgias and myalgias.   Skin:  Negative for color change and rash.   Neurological:  Negative for dizziness, weakness, light-headedness and headaches.       Historical Information   The patient history was reviewed as follows:  Past Medical History[1]      Past Surgical History[2]  Family History[3]   Social History   Social History     Substance and Sexual Activity   Alcohol Use No     Social History     Substance and Sexual Activity   Drug Use No     Tobacco Use History[4]    Medications:   Current Medications[5]    Allergies[6]    OBJECTIVE  Vitals:   Vitals:    06/10/25 1452   BP: 112/74   BP Location: Left arm   Patient Position: Sitting   Cuff Size: Large   Pulse: 83   Resp: 16   Temp: 98.7 °F (37.1 °C)   TempSrc: Tympanic   SpO2: 100%   Weight: 73 kg (161 lb)   Height: 5' (1.524 m)         Physical Exam  Constitutional:       Appearance: She is well-developed.   HENT:      Head: Normocephalic and atraumatic.     Cardiovascular:      Rate and Rhythm: Normal rate and regular rhythm.      Heart sounds: Murmur heard.   Pulmonary:      Effort: Pulmonary effort is normal. No respiratory distress.      Breath sounds: Normal breath sounds. No wheezing.   Abdominal:      General: Bowel sounds are normal. There is no distension.      Palpations: Abdomen is soft.      Tenderness: There is no abdominal tenderness.     Musculoskeletal:         General: No tenderness. Normal range of motion.      Cervical back: Normal range of motion and neck supple.     Skin:     General: Skin is warm and dry.     Neurological:      Mental Status: She is alert and oriented to person, place, and time.     Psychiatric:         Behavior:  Behavior normal.                    Kourtney Altman,     6/10/2025           [1]   Past Medical History:  Diagnosis Date    Migraine    [2]   Past Surgical History:  Procedure Laterality Date    APPENDECTOMY       SECTION      With ovarian cyst removed    TUBAL LIGATION     [3]   Family History  Problem Relation Name Age of Onset    Clotting disorder Mother      No Known Problems Father     [4]   Social History  Tobacco Use   Smoking Status Never   Smokeless Tobacco Never   [5]   Current Outpatient Medications:     cetirizine (ZyrTEC) 10 mg tablet, Take 10 mg by mouth daily, Disp: , Rfl:     FLUoxetine (PROzac) 40 MG capsule, Take 40 mg by mouth daily, Disp: , Rfl:     MAGNESIUM PO, Take by mouth, Disp: , Rfl:     Rimegepant Sulfate (Nurtec) 75 MG TBDP, Take 75 mg by mouth as needed (migraine) Limit of 1 in 24 hours (Patient taking differently: Take 75 mg by mouth as needed (migraine) Limit of 1 in 24 hours-takes every other day), Disp: 8 tablet, Rfl: 3    albuterol (Proventil HFA) 90 mcg/act inhaler, Inhale 2 puffs as needed  (Patient not taking: Reported on 2022), Disp: , Rfl:     Botulinum Toxin Type A (Botox) 200 units SOLR, Inject as directed Every 3 months (Patient not taking: Reported on 6/10/2025), Disp: , Rfl:     cloNIDine (CATAPRES) 0.1 mg tablet, Take 0.1 mg by mouth every 12 (twelve) hours (Patient not taking: Reported on 10/12/2022), Disp: , Rfl:     ergocalciferol (VITAMIN D2) 50,000 units, Take 50,000 Units by mouth once a week (Patient not taking: Reported on 6/10/2025), Disp: , Rfl:     Galcanezumab-gnlm 120 MG/ML SOAJ, Inject 120 mg under the skin every 30 (thirty) days This is a 90 day supply, please dispense 3 pens. (Patient not taking: Reported on 2022), Disp: 3 mL, Rfl: 3    Galcanezumab-gnlm 120 MG/ML SOAJ, Inject 120 mg under the skin every 30 (thirty) days (Patient not taking: Reported on 2022), Disp: 1 mL, Rfl: 11    hydrOXYzine HCL (ATARAX) 25 mg  tablet, Take 1 tablet (25 mg total) by mouth every 6 (six) hours as needed for itching for up to 7 days, Disp: 30 tablet, Rfl: 0    loratadine (CLARITIN) 10 mg tablet, Take 10 mg by mouth daily (Patient not taking: Reported on 11/29/2022), Disp: , Rfl:     methylPREDNISolone 4 MG tablet therapy pack, Use as directed on package (Patient not taking: Reported on 11/29/2022), Disp: 1 each, Rfl: 0    naproxen (NAPROSYN) 500 mg tablet, TAKE 1 TABLET IN THE MORNING DURING MENSTRUAL CYCLE (Patient not taking: Reported on 6/10/2025), Disp: 45 tablet, Rfl: 3    rizatriptan (MAXALT) 10 MG tablet, TAKE 1 TABLET AS NEEDED FOR MIGRAINE.  MAY REPEAT IN 2 HOURS IF NEEDED. LIMIT 3 A WEEK OR 9 A MONTH. (THIS IS A 90 DAY SUPPLY) (Patient not taking: Reported on 11/29/2022), Disp: 27 tablet, Rfl: 3    Sod Picosulfate-Mag Ox-Cit Acd (Clenpiq) 10-3.5-12 MG-GM -GM/160ML SOLN, Take 1 kit by mouth see administration instructions (Patient not taking: Reported on 11/29/2022), Disp: 160 mL, Rfl: 0    Tranexamic Acid 650 MG TABS, Take 2 tablets 3 times a day on heaviest days of flow for up to 5 days. (Patient not taking: Reported on 11/29/2022), Disp: , Rfl:     venlafaxine (EFFEXOR-XR) 150 mg 24 hr capsule, Take 1 capsule (150 mg total) by mouth daily (Patient not taking: Reported on 6/10/2025), Disp: 90 capsule, Rfl: 3  [6]   Allergies  Allergen Reactions    Bee Venom Anaphylaxis and Swelling     Yellow jacket- localized edema

## 2025-06-25 LAB
1,25(OH)2D3 SERPL-MCNC: 60 PG/ML (ref 24.8–81.5)
ALBUMIN SERPL-MCNC: 4.1 G/DL (ref 3.9–4.9)
ALP SERPL-CCNC: 44 IU/L (ref 44–121)
ALT SERPL-CCNC: 19 IU/L (ref 0–32)
AST SERPL-CCNC: 21 IU/L (ref 0–40)
B BURGDOR IGG+IGM SER QL IA: NEGATIVE
BASOPHILS # BLD AUTO: 0.1 X10E3/UL (ref 0–0.2)
BASOPHILS NFR BLD AUTO: 1 %
BILIRUB SERPL-MCNC: 0.9 MG/DL (ref 0–1.2)
BUN SERPL-MCNC: 16 MG/DL (ref 6–24)
BUN/CREAT SERPL: 18 (ref 9–23)
CALCIUM SERPL-MCNC: 8.9 MG/DL (ref 8.7–10.2)
CHLORIDE SERPL-SCNC: 104 MMOL/L (ref 96–106)
CHOLEST SERPL-MCNC: 178 MG/DL (ref 100–199)
CHOLEST/HDLC SERPL: 3 RATIO (ref 0–4.4)
CO2 SERPL-SCNC: 20 MMOL/L (ref 20–29)
CREAT SERPL-MCNC: 0.88 MG/DL (ref 0.57–1)
EGFR: 81 ML/MIN/1.73
EOSINOPHIL # BLD AUTO: 0.1 X10E3/UL (ref 0–0.4)
EOSINOPHIL NFR BLD AUTO: 2 %
ERYTHROCYTE [DISTWIDTH] IN BLOOD BY AUTOMATED COUNT: 15.9 % (ref 11.7–15.4)
FERRITIN SERPL-MCNC: 10 NG/ML (ref 15–150)
GLOBULIN SER-MCNC: 2.3 G/DL (ref 1.5–4.5)
GLUCOSE SERPL-MCNC: 83 MG/DL (ref 70–99)
HCT VFR BLD AUTO: 35.8 % (ref 34–46.6)
HDLC SERPL-MCNC: 60 MG/DL
HGB BLD-MCNC: 11.2 G/DL (ref 11.1–15.9)
IMM GRANULOCYTES # BLD: 0 X10E3/UL (ref 0–0.1)
IMM GRANULOCYTES NFR BLD: 0 %
IRON SERPL-MCNC: 56 UG/DL (ref 27–159)
LDLC SERPL CALC-MCNC: 106 MG/DL (ref 0–99)
LYMPHOCYTES # BLD AUTO: 1.2 X10E3/UL (ref 0.7–3.1)
LYMPHOCYTES NFR BLD AUTO: 23 %
MCH RBC QN AUTO: 27.7 PG (ref 26.6–33)
MCHC RBC AUTO-ENTMCNC: 31.3 G/DL (ref 31.5–35.7)
MCV RBC AUTO: 88 FL (ref 79–97)
MONOCYTES # BLD AUTO: 0.5 X10E3/UL (ref 0.1–0.9)
MONOCYTES NFR BLD AUTO: 10 %
NEUTROPHILS # BLD AUTO: 3.4 X10E3/UL (ref 1.4–7)
NEUTROPHILS NFR BLD AUTO: 64 %
PLATELET # BLD AUTO: 242 X10E3/UL (ref 150–450)
POTASSIUM SERPL-SCNC: 4.5 MMOL/L (ref 3.5–5.2)
PROT SERPL-MCNC: 6.4 G/DL (ref 6–8.5)
RBC # BLD AUTO: 4.05 X10E6/UL (ref 3.77–5.28)
SL AMB VLDL CHOLESTEROL CALC: 12 MG/DL (ref 5–40)
SODIUM SERPL-SCNC: 139 MMOL/L (ref 134–144)
TRIGL SERPL-MCNC: 61 MG/DL (ref 0–149)
TSH SERPL DL<=0.005 MIU/L-ACNC: 1.78 UIU/ML (ref 0.45–4.5)
WBC # BLD AUTO: 5.4 X10E3/UL (ref 3.4–10.8)

## 2025-07-03 ENCOUNTER — HOSPITAL ENCOUNTER (OUTPATIENT)
Dept: NON INVASIVE DIAGNOSTICS | Facility: HOSPITAL | Age: 50
Discharge: HOME | End: 2025-07-03
Attending: FAMILY MEDICINE
Payer: COMMERCIAL

## 2025-07-03 VITALS
BODY MASS INDEX: 31.64 KG/M2 | DIASTOLIC BLOOD PRESSURE: 74 MMHG | HEIGHT: 60 IN | SYSTOLIC BLOOD PRESSURE: 112 MMHG | WEIGHT: 161.16 LBS

## 2025-07-03 DIAGNOSIS — R01.1 CARDIAC MURMUR: ICD-10-CM

## 2025-07-03 DIAGNOSIS — I35.9 AORTIC VALVE DISORDER: Primary | ICD-10-CM

## 2025-07-03 LAB
AORTIC VALVE MEAN VELOCITY: 10.6 M/S
ASCENDING AORTA: 2.6 CM
AV AREA BY CONTINUOUS VTI: 1.8 CM2
AV AREA PEAK VELOCITY: 1.8 CM2
AV LVOT MEAN GRADIENT: 2 MMHG
AV LVOT PEAK GRADIENT: 4 MMHG
AV MEAN PRESS GRAD SYS DOP V1V2: 5 MMHG
AV ORIFICE AREA US: 1.83 CM2
AV PEAK GRADIENT: 10 MMHG
AV VELOCITY RATIO: 0.65
BSA FOR ECHO PROCEDURE: 1.7 M2
DOP CALC AO VTI: 32.5 CM
DOP CALC LVOT AREA: 2.83
DOP CALC LVOT DIAMETER: 1.9 CM
DOP CALC LVOT PEAK VEL VTI: 21 CM
DOP CALC LVOT PEAK VEL: 0.98 M/S
DOP CALC LVOT STROKE INDEX: 35.3 ML/M2
DOP CALC LVOT STROKE VOLUME: 59.51
E WAVE DECELERATION TIME: 148 MS
E/A RATIO: 1.32
FRACTIONAL SHORTENING: 38 (ref 28–44)
GLOBAL LONGITUIDAL STRAIN: -20 %
INTERVENTRICULAR SEPTUM IN DIASTOLE (PARASTERNAL SHORT AXIS VIEW): 0.8 CM
INTERVENTRICULAR SEPTUM: 0.8 CM (ref 0.6–1.1)
LAAS-AP4: 12.6 CM2
LEFT ATRIUM SIZE: 2.9 CM
LEFT INTERNAL DIMENSION IN SYSTOLE: 2.8 CM (ref 2.1–4)
LEFT VENTRICLE DIASTOLIC VOLUME (MOD BIPLANE): 89 ML
LEFT VENTRICLE DIASTOLIC VOLUME INDEX (MOD BIPLANE): 52.4 ML/M2
LEFT VENTRICLE SYSTOLIC VOLUME (MOD BIPLANE): 30 ML
LEFT VENTRICLE SYSTOLIC VOLUME INDEX (MOD BIPLANE): 17.6 ML/M2
LEFT VENTRICULAR INTERNAL DIMENSION IN DIASTOLE: 4.5 CM (ref 3.5–6)
LEFT VENTRICULAR POSTERIOR WALL IN END DIASTOLE: 0.8 CM
LEFT VENTRICULAR STROKE VOLUME: 61 ML
LV EF BIPLANE MOD: 66 %
LV EF US.2D.A4C+ESTIMATED: 61 %
LVSV (TEICH): 61 ML
MV E'TISSUE VEL-LAT: 14 CM/S
MV E'TISSUE VEL-SEP: 11 CM/S
MV PEAK A VEL: 0.69 M/S
MV PEAK E VEL: 91 CM/S
RA PRESSURE ESTIMATED: 3 MMHG
RIGHT VENTRICLE ID DIMENSION: 2.5 CM
SINOTUBULAR JUNCTION: 2.3 CM
SL CV LV EF: 65
SL CV PED ECHO LEFT VENTRICLE DIASTOLIC VOLUME (MOD BIPLANE) 2D: 92 ML
SL CV PED ECHO LEFT VENTRICLE SYSTOLIC VOLUME (MOD BIPLANE) 2D: 30 ML
SL CV SINUS OF VALSALVA 2D: 2.8 CM
STJ: 2.3 CM
TRICUSPID ANNULAR PLANE SYSTOLIC EXCURSION: 2.6 CM

## 2025-07-03 PROCEDURE — 93306 TTE W/DOPPLER COMPLETE: CPT | Performed by: INTERNAL MEDICINE

## 2025-07-03 PROCEDURE — 93306 TTE W/DOPPLER COMPLETE: CPT

## 2025-07-21 ENCOUNTER — OFFICE VISIT (OUTPATIENT)
Facility: HOSPITAL | Age: 50
End: 2025-07-21
Attending: NURSE PRACTITIONER
Payer: COMMERCIAL

## 2025-07-21 VITALS
OXYGEN SATURATION: 99 % | HEART RATE: 82 BPM | RESPIRATION RATE: 18 BRPM | SYSTOLIC BLOOD PRESSURE: 118 MMHG | DIASTOLIC BLOOD PRESSURE: 78 MMHG

## 2025-07-21 DIAGNOSIS — G43.709 CHRONIC MIGRAINE WITHOUT AURA WITHOUT STATUS MIGRAINOSUS, NOT INTRACTABLE: Primary | ICD-10-CM

## 2025-07-21 RX ORDER — RIMEGEPANT SULFATE 75 MG/75MG
75 TABLET, ORALLY DISINTEGRATING ORAL AS NEEDED
Qty: 16 TABLET | Refills: 3 | Status: SHIPPED | OUTPATIENT
Start: 2025-07-21

## 2025-07-21 RX ORDER — CETIRIZINE HYDROCHLORIDE 10 MG/1
10 TABLET ORAL DAILY
COMMUNITY

## 2025-07-21 NOTE — PROGRESS NOTES
Indication: Chronic migraine headache      Since last seen headaches have improved with Botox injections. Since starting botox, the patient reports greater than 7 days of migraine relief from baseline, correlated with headache diary, decreased abortive medication use.     At least 50 % of reduction of frequency and severity of moderate to severe headaches          Procedure details:     Position:  Upright     Botulinum toxin:    Botox Type:  Type A    Brand:  Botox     Botox 200  NDC#4450-3757-51     LOT#[id number]  EXP : 10/2027     Since last seen headaches have improved with Botox injections. Since starting botox, the patient reports greater than 7 days of migraine relief from baseline, correlated with headache diary, decreased abortive medication use.       Procedures:          L  injection amount:  5 units     R  injection amount:  5 units    Procerus injection amount:  5 units       L lateral frontalis:  5 units     R lateral frontalis:  5 units     L medial frontalis:  5 units     R medial frontalis:  5 units     Masseter right  10 units   Masseter left  10 units       L temporalis injection amount:  20 units (divided in 4 areas )     R temporalis injection amount:  20 units (divided in 4 areas )       L medial occipitalis injection amount:  15 units (divided in 3 areas )     R medial occipitalis injection amount:  15 units (divided in 3 areas )        L superior cervical paraspinal injection amount:  10 units (divided in 2 areas )     R superior cervical paraspinal injection amount:  10 units (divided in 2 areas)        L superior trapezius injection amount:  15 units (divided in 3 areas )     R superior trapezius injection amount:  15 units (divided in 3 areas)      Total Units:        Total units used:  175 units    Total units discarded:  25 units     Post-procedure details:       Chemodenervation:  Chronic migraine headaches    Patient tolerance of procedure:  Tolerated well, no  immediate complications    Diagnoses and all orders for this visit:    Chronic migraine without aura without status migrainosus, not intractable  -     [facility] Botulinum Toxin Injection Appointment Request  -     botulinum toxin Type A (BOTOX) 200 unit injection 200 Units    -     rimegepant (NURTEC ODT) 75 mg tablet,disintegrating; Take 1 tablet (75 mg total) by mouth as needed (migraine). Max one tab per day    Other orders  -     botulinum toxin Type A (BOTOX) 200 unit injection 200 Units        Consider adding a muscle relaxant (flexeril 5 mg ) for neck pain and muscle spasm

## 2025-08-18 ENCOUNTER — OFFICE VISIT (OUTPATIENT)
Dept: FAMILY MEDICINE CLINIC | Facility: CLINIC | Age: 50
End: 2025-08-18
Payer: COMMERCIAL

## 2025-08-18 VITALS
OXYGEN SATURATION: 99 % | SYSTOLIC BLOOD PRESSURE: 110 MMHG | BODY MASS INDEX: 31.37 KG/M2 | DIASTOLIC BLOOD PRESSURE: 76 MMHG | RESPIRATION RATE: 18 BRPM | HEIGHT: 60 IN | HEART RATE: 75 BPM | WEIGHT: 159.8 LBS | TEMPERATURE: 98 F

## 2025-08-18 DIAGNOSIS — E66.9 OBESITY (BMI 30-39.9): ICD-10-CM

## 2025-08-18 DIAGNOSIS — Z00.00 ANNUAL PHYSICAL EXAM: Primary | ICD-10-CM

## 2025-08-18 DIAGNOSIS — R01.1 CARDIAC MURMUR: ICD-10-CM

## 2025-08-18 DIAGNOSIS — F32.2 SEVERE MAJOR DEPRESSIVE DISORDER (HCC): ICD-10-CM

## 2025-08-18 PROCEDURE — 99396 PREV VISIT EST AGE 40-64: CPT | Performed by: FAMILY MEDICINE
